# Patient Record
Sex: FEMALE | Race: BLACK OR AFRICAN AMERICAN | NOT HISPANIC OR LATINO | Employment: OTHER | URBAN - METROPOLITAN AREA
[De-identification: names, ages, dates, MRNs, and addresses within clinical notes are randomized per-mention and may not be internally consistent; named-entity substitution may affect disease eponyms.]

---

## 2017-01-25 DIAGNOSIS — M53.3 S I JOINT DYSFUNCTION: Primary | ICD-10-CM

## 2017-02-01 ENCOUNTER — CLINICAL SUPPORT (OUTPATIENT)
Dept: REHABILITATION | Facility: HOSPITAL | Age: 62
End: 2017-02-01
Attending: INTERNAL MEDICINE
Payer: MEDICARE

## 2017-02-01 DIAGNOSIS — M53.3 S I JOINT DYSFUNCTION: Primary | ICD-10-CM

## 2017-02-01 DIAGNOSIS — G89.29 CHRONIC LOW BACK PAIN, UNSPECIFIED BACK PAIN LATERALITY, WITH SCIATICA PRESENCE UNSPECIFIED: ICD-10-CM

## 2017-02-01 DIAGNOSIS — M54.5 CHRONIC LOW BACK PAIN, UNSPECIFIED BACK PAIN LATERALITY, WITH SCIATICA PRESENCE UNSPECIFIED: ICD-10-CM

## 2017-02-01 PROCEDURE — 97535 SELF CARE MNGMENT TRAINING: CPT | Performed by: PHYSICAL THERAPIST

## 2017-02-01 PROCEDURE — G8978 MOBILITY CURRENT STATUS: HCPCS | Mod: CJ | Performed by: PHYSICAL THERAPIST

## 2017-02-01 PROCEDURE — 97162 PT EVAL MOD COMPLEX 30 MIN: CPT | Performed by: PHYSICAL THERAPIST

## 2017-02-01 PROCEDURE — 97140 MANUAL THERAPY 1/> REGIONS: CPT | Performed by: PHYSICAL THERAPIST

## 2017-02-01 PROCEDURE — G8979 MOBILITY GOAL STATUS: HCPCS | Mod: CJ | Performed by: PHYSICAL THERAPIST
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
## 2017-02-07 ENCOUNTER — CLINICAL SUPPORT (OUTPATIENT)
Dept: REHABILITATION | Facility: HOSPITAL | Age: 62
End: 2017-02-07
Attending: INTERNAL MEDICINE
Payer: COMMERCIAL

## 2017-02-07 DIAGNOSIS — M54.5 CHRONIC LOW BACK PAIN, UNSPECIFIED BACK PAIN LATERALITY, WITH SCIATICA PRESENCE UNSPECIFIED: ICD-10-CM

## 2017-02-07 DIAGNOSIS — M53.3 S I JOINT DYSFUNCTION: Primary | ICD-10-CM

## 2017-02-07 DIAGNOSIS — G89.29 CHRONIC LOW BACK PAIN, UNSPECIFIED BACK PAIN LATERALITY, WITH SCIATICA PRESENCE UNSPECIFIED: ICD-10-CM

## 2017-02-07 PROCEDURE — 97110 THERAPEUTIC EXERCISES: CPT | Performed by: PHYSICAL THERAPIST

## 2017-02-07 PROCEDURE — 97140 MANUAL THERAPY 1/> REGIONS: CPT | Performed by: PHYSICAL THERAPIST
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
## 2017-02-09 ENCOUNTER — CLINICAL SUPPORT (OUTPATIENT)
Dept: REHABILITATION | Facility: HOSPITAL | Age: 62
End: 2017-02-09
Attending: INTERNAL MEDICINE
Payer: MEDICARE

## 2017-02-09 DIAGNOSIS — M54.5 CHRONIC LOW BACK PAIN, UNSPECIFIED BACK PAIN LATERALITY, WITH SCIATICA PRESENCE UNSPECIFIED: ICD-10-CM

## 2017-02-09 DIAGNOSIS — M53.3 S I JOINT DYSFUNCTION: Primary | ICD-10-CM

## 2017-02-09 DIAGNOSIS — G89.29 CHRONIC LOW BACK PAIN, UNSPECIFIED BACK PAIN LATERALITY, WITH SCIATICA PRESENCE UNSPECIFIED: ICD-10-CM

## 2017-02-09 PROCEDURE — 97110 THERAPEUTIC EXERCISES: CPT | Performed by: PHYSICAL THERAPIST

## 2017-02-09 PROCEDURE — 97140 MANUAL THERAPY 1/> REGIONS: CPT | Performed by: PHYSICAL THERAPIST
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
## 2017-02-14 ENCOUNTER — CLINICAL SUPPORT (OUTPATIENT)
Dept: REHABILITATION | Facility: HOSPITAL | Age: 62
End: 2017-02-14
Attending: INTERNAL MEDICINE
Payer: COMMERCIAL

## 2017-02-14 DIAGNOSIS — G89.29 CHRONIC LOW BACK PAIN, UNSPECIFIED BACK PAIN LATERALITY, WITH SCIATICA PRESENCE UNSPECIFIED: ICD-10-CM

## 2017-02-14 DIAGNOSIS — M54.5 CHRONIC LOW BACK PAIN, UNSPECIFIED BACK PAIN LATERALITY, WITH SCIATICA PRESENCE UNSPECIFIED: ICD-10-CM

## 2017-02-14 DIAGNOSIS — M53.3 S I JOINT DYSFUNCTION: Primary | ICD-10-CM

## 2017-02-14 PROCEDURE — 97110 THERAPEUTIC EXERCISES: CPT | Performed by: PHYSICAL THERAPIST
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
## 2017-02-21 ENCOUNTER — OFFICE VISIT (OUTPATIENT)
Dept: OBSTETRICS AND GYNECOLOGY | Facility: CLINIC | Age: 62
End: 2017-02-21
Payer: MEDICARE

## 2017-02-21 VITALS — HEIGHT: 66 IN | BODY MASS INDEX: 33.2 KG/M2 | WEIGHT: 206.56 LBS

## 2017-02-21 DIAGNOSIS — Z12.31 ENCOUNTER FOR SCREENING MAMMOGRAM FOR BREAST CANCER: Primary | ICD-10-CM

## 2017-02-21 DIAGNOSIS — N64.4 PAIN OF RIGHT BREAST: ICD-10-CM

## 2017-02-21 PROCEDURE — 99999 PR PBB SHADOW E&M-EST. PATIENT-LVL III: CPT | Mod: PBBFAC,,, | Performed by: OBSTETRICS & GYNECOLOGY

## 2017-02-21 PROCEDURE — 99213 OFFICE O/P EST LOW 20 MIN: CPT | Mod: S$GLB,,, | Performed by: OBSTETRICS & GYNECOLOGY

## 2017-02-21 RX ORDER — MAGNESIUM 200 MG
1 TABLET ORAL
COMMUNITY
Start: 2017-02-15

## 2017-02-21 RX ORDER — ATORVASTATIN CALCIUM 20 MG/1
10 TABLET, FILM COATED ORAL NIGHTLY
Refills: 0 | COMMUNITY
Start: 2016-12-21

## 2017-02-21 RX ORDER — MONTELUKAST SODIUM 10 MG/1
10 TABLET ORAL NIGHTLY
Refills: 1 | COMMUNITY
Start: 2017-02-15 | End: 2018-05-08

## 2017-02-21 RX ORDER — PROMETHAZINE HYDROCHLORIDE AND DEXTROMETHORPHAN HYDROBROMIDE 6.25; 15 MG/5ML; MG/5ML
SYRUP ORAL
Refills: 0 | COMMUNITY
Start: 2017-02-13 | End: 2017-05-02 | Stop reason: ALTCHOICE

## 2017-02-21 RX ORDER — AZITHROMYCIN 250 MG/1
TABLET, FILM COATED ORAL
COMMUNITY
Start: 2017-02-20 | End: 2017-02-25

## 2017-02-21 RX ORDER — VALSARTAN AND HYDROCHLOROTHIAZIDE 320; 12.5 MG/1; MG/1
1 TABLET, FILM COATED ORAL DAILY
Refills: 0 | COMMUNITY
Start: 2017-02-05

## 2017-02-21 NOTE — PROGRESS NOTES
"  Subjective:       Patient ID: Alisa Millan is a 62 y.o. female.    Chief Complaint:  Breast Pain (Right breast)    History of Present Illness  HPI  presents c/o "nagging pain" in right breast. has been present since last visit, no worsening. pt did fall 1 month ago & hit breast. normal up to date mmg, due for repeat in 3/2017. wears sports bras    GYN & OB History  No LMP recorded. Patient is postmenopausal.   Date of Last Pap: 3/23/2016    OB History    Para Term  AB SAB TAB Ectopic Multiple Living   2 2              # Outcome Date GA Lbr [place]/2nd Weight Sex Delivery Anes PTL Lv   2 Para            1 Para                   Review of Systems  Review of Systems   All other systems reviewed and are negative.    Objective:    Physical Exam:   Constitutional: She is oriented to person, place, and time. She appears well-developed and well-nourished.        Pulmonary/Chest: Effort normal.                      Neurological: She is alert and oriented to person, place, and time.    Skin: Skin is dry.    Psychiatric: She has a normal mood and affect. Her behavior is normal.        BREASTS: symmetrical, normal skin/nipple, no abnormal masses. Mild tenderness bilateral medial sides of breasts    Assessment:        1. Encounter for screening mammogram for breast cancer    2. Pain of right breast      Plan:      1. Nsaids/compresses-benign breast pain  2. mmg for 2017 scheduled      "

## 2017-03-01 ENCOUNTER — CLINICAL SUPPORT (OUTPATIENT)
Dept: REHABILITATION | Facility: HOSPITAL | Age: 62
End: 2017-03-01
Attending: INTERNAL MEDICINE
Payer: MEDICARE

## 2017-03-01 DIAGNOSIS — M54.5 CHRONIC LOW BACK PAIN, UNSPECIFIED BACK PAIN LATERALITY, WITH SCIATICA PRESENCE UNSPECIFIED: ICD-10-CM

## 2017-03-01 DIAGNOSIS — M53.3 S I JOINT DYSFUNCTION: Primary | ICD-10-CM

## 2017-03-01 DIAGNOSIS — G89.29 CHRONIC LOW BACK PAIN, UNSPECIFIED BACK PAIN LATERALITY, WITH SCIATICA PRESENCE UNSPECIFIED: ICD-10-CM

## 2017-03-01 PROCEDURE — 97110 THERAPEUTIC EXERCISES: CPT | Performed by: PHYSICAL THERAPIST

## 2017-03-01 PROCEDURE — 97140 MANUAL THERAPY 1/> REGIONS: CPT | Performed by: PHYSICAL THERAPIST
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
## 2017-03-08 ENCOUNTER — CLINICAL SUPPORT (OUTPATIENT)
Dept: REHABILITATION | Facility: HOSPITAL | Age: 62
End: 2017-03-08
Attending: INTERNAL MEDICINE
Payer: MEDICARE

## 2017-03-08 DIAGNOSIS — G89.29 CHRONIC LOW BACK PAIN, UNSPECIFIED BACK PAIN LATERALITY, WITH SCIATICA PRESENCE UNSPECIFIED: ICD-10-CM

## 2017-03-08 DIAGNOSIS — M53.3 S I JOINT DYSFUNCTION: Primary | ICD-10-CM

## 2017-03-08 DIAGNOSIS — M54.5 CHRONIC LOW BACK PAIN, UNSPECIFIED BACK PAIN LATERALITY, WITH SCIATICA PRESENCE UNSPECIFIED: ICD-10-CM

## 2017-03-08 PROCEDURE — 97140 MANUAL THERAPY 1/> REGIONS: CPT | Performed by: PHYSICAL THERAPIST

## 2017-03-08 PROCEDURE — 97110 THERAPEUTIC EXERCISES: CPT | Performed by: PHYSICAL THERAPIST
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
## 2017-03-15 ENCOUNTER — CLINICAL SUPPORT (OUTPATIENT)
Dept: REHABILITATION | Facility: HOSPITAL | Age: 62
End: 2017-03-15
Attending: INTERNAL MEDICINE
Payer: MEDICARE

## 2017-03-15 DIAGNOSIS — G89.29 CHRONIC LOW BACK PAIN, UNSPECIFIED BACK PAIN LATERALITY, WITH SCIATICA PRESENCE UNSPECIFIED: ICD-10-CM

## 2017-03-15 DIAGNOSIS — M53.3 S I JOINT DYSFUNCTION: Primary | ICD-10-CM

## 2017-03-15 DIAGNOSIS — M54.5 CHRONIC LOW BACK PAIN, UNSPECIFIED BACK PAIN LATERALITY, WITH SCIATICA PRESENCE UNSPECIFIED: ICD-10-CM

## 2017-03-15 PROCEDURE — 97110 THERAPEUTIC EXERCISES: CPT | Performed by: PHYSICAL THERAPIST

## 2017-03-15 PROCEDURE — 97140 MANUAL THERAPY 1/> REGIONS: CPT | Performed by: PHYSICAL THERAPIST

## 2017-04-05 ENCOUNTER — CLINICAL SUPPORT (OUTPATIENT)
Dept: REHABILITATION | Facility: HOSPITAL | Age: 62
End: 2017-04-05
Attending: INTERNAL MEDICINE
Payer: COMMERCIAL

## 2017-04-05 DIAGNOSIS — M53.3 S I JOINT DYSFUNCTION: Primary | ICD-10-CM

## 2017-04-05 DIAGNOSIS — M54.5 CHRONIC LOW BACK PAIN, UNSPECIFIED BACK PAIN LATERALITY, WITH SCIATICA PRESENCE UNSPECIFIED: ICD-10-CM

## 2017-04-05 DIAGNOSIS — G89.29 CHRONIC LOW BACK PAIN, UNSPECIFIED BACK PAIN LATERALITY, WITH SCIATICA PRESENCE UNSPECIFIED: ICD-10-CM

## 2017-04-05 PROCEDURE — 97140 MANUAL THERAPY 1/> REGIONS: CPT | Performed by: PHYSICAL THERAPIST

## 2017-04-05 PROCEDURE — 97110 THERAPEUTIC EXERCISES: CPT | Performed by: PHYSICAL THERAPIST
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
## 2017-04-07 ENCOUNTER — HOSPITAL ENCOUNTER (OUTPATIENT)
Dept: RADIOLOGY | Facility: HOSPITAL | Age: 62
Discharge: HOME | End: 2017-04-07
Attending: OBSTETRICS & GYNECOLOGY
Payer: MEDICARE

## 2017-04-07 DIAGNOSIS — Z12.31 ENCOUNTER FOR SCREENING MAMMOGRAM FOR BREAST CANCER: ICD-10-CM

## 2017-04-07 PROCEDURE — 77067 SCR MAMMO BI INCL CAD: CPT | Mod: TC

## 2017-04-07 PROCEDURE — 77063 BREAST TOMOSYNTHESIS BI: CPT | Mod: 26,,, | Performed by: RADIOLOGY

## 2017-04-07 PROCEDURE — 77067 SCR MAMMO BI INCL CAD: CPT | Mod: 26,,, | Performed by: RADIOLOGY

## 2017-04-12 ENCOUNTER — CLINICAL SUPPORT (OUTPATIENT)
Dept: REHABILITATION | Facility: HOSPITAL | Age: 62
End: 2017-04-12
Attending: INTERNAL MEDICINE
Payer: COMMERCIAL

## 2017-04-12 DIAGNOSIS — M54.5 CHRONIC LOW BACK PAIN, UNSPECIFIED BACK PAIN LATERALITY, WITH SCIATICA PRESENCE UNSPECIFIED: ICD-10-CM

## 2017-04-12 DIAGNOSIS — G89.29 CHRONIC LOW BACK PAIN, UNSPECIFIED BACK PAIN LATERALITY, WITH SCIATICA PRESENCE UNSPECIFIED: ICD-10-CM

## 2017-04-12 DIAGNOSIS — M53.3 S I JOINT DYSFUNCTION: Primary | ICD-10-CM

## 2017-04-12 PROCEDURE — 97140 MANUAL THERAPY 1/> REGIONS: CPT | Performed by: PHYSICAL THERAPIST

## 2017-04-12 PROCEDURE — 97110 THERAPEUTIC EXERCISES: CPT | Performed by: PHYSICAL THERAPIST
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
## 2017-04-19 ENCOUNTER — CLINICAL SUPPORT (OUTPATIENT)
Dept: REHABILITATION | Facility: HOSPITAL | Age: 62
End: 2017-04-19
Attending: INTERNAL MEDICINE
Payer: COMMERCIAL

## 2017-04-19 DIAGNOSIS — G89.29 CHRONIC LOW BACK PAIN, UNSPECIFIED BACK PAIN LATERALITY, WITH SCIATICA PRESENCE UNSPECIFIED: ICD-10-CM

## 2017-04-19 DIAGNOSIS — M53.3 S I JOINT DYSFUNCTION: Primary | ICD-10-CM

## 2017-04-19 DIAGNOSIS — M54.5 CHRONIC LOW BACK PAIN, UNSPECIFIED BACK PAIN LATERALITY, WITH SCIATICA PRESENCE UNSPECIFIED: ICD-10-CM

## 2017-04-19 PROCEDURE — G8978 MOBILITY CURRENT STATUS: HCPCS | Mod: CI | Performed by: PHYSICAL THERAPIST

## 2017-04-19 PROCEDURE — G8979 MOBILITY GOAL STATUS: HCPCS | Mod: CJ | Performed by: PHYSICAL THERAPIST

## 2017-04-19 PROCEDURE — 97140 MANUAL THERAPY 1/> REGIONS: CPT | Performed by: PHYSICAL THERAPIST

## 2017-04-19 PROCEDURE — 97110 THERAPEUTIC EXERCISES: CPT | Performed by: PHYSICAL THERAPIST
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
## 2017-04-28 ENCOUNTER — CLINICAL SUPPORT (OUTPATIENT)
Dept: REHABILITATION | Facility: HOSPITAL | Age: 62
End: 2017-04-28
Attending: INTERNAL MEDICINE
Payer: COMMERCIAL

## 2017-04-28 DIAGNOSIS — M53.3 S I JOINT DYSFUNCTION: Primary | ICD-10-CM

## 2017-04-28 DIAGNOSIS — G89.29 CHRONIC LOW BACK PAIN, UNSPECIFIED BACK PAIN LATERALITY, WITH SCIATICA PRESENCE UNSPECIFIED: ICD-10-CM

## 2017-04-28 DIAGNOSIS — M54.5 CHRONIC LOW BACK PAIN, UNSPECIFIED BACK PAIN LATERALITY, WITH SCIATICA PRESENCE UNSPECIFIED: ICD-10-CM

## 2017-04-28 PROCEDURE — 97110 THERAPEUTIC EXERCISES: CPT | Performed by: PHYSICAL THERAPIST

## 2017-04-28 PROCEDURE — 97140 MANUAL THERAPY 1/> REGIONS: CPT | Performed by: PHYSICAL THERAPIST
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
## 2017-05-02 ENCOUNTER — CLINICAL SUPPORT (OUTPATIENT)
Dept: REHABILITATION | Facility: HOSPITAL | Age: 62
End: 2017-05-02
Attending: INTERNAL MEDICINE
Payer: COMMERCIAL

## 2017-05-02 ENCOUNTER — OFFICE VISIT (OUTPATIENT)
Dept: OPHTHALMOLOGY | Facility: CLINIC | Age: 62
End: 2017-05-02
Payer: COMMERCIAL

## 2017-05-02 DIAGNOSIS — G89.29 CHRONIC LOW BACK PAIN, UNSPECIFIED BACK PAIN LATERALITY, WITH SCIATICA PRESENCE UNSPECIFIED: ICD-10-CM

## 2017-05-02 DIAGNOSIS — H52.4 MYOPIA WITH PRESBYOPIA, BILATERAL: ICD-10-CM

## 2017-05-02 DIAGNOSIS — M53.3 S I JOINT DYSFUNCTION: Primary | ICD-10-CM

## 2017-05-02 DIAGNOSIS — M54.5 CHRONIC LOW BACK PAIN, UNSPECIFIED BACK PAIN LATERALITY, WITH SCIATICA PRESENCE UNSPECIFIED: ICD-10-CM

## 2017-05-02 DIAGNOSIS — E11.9 TYPE 2 DIABETES MELLITUS WITHOUT COMPLICATION, UNSPECIFIED LONG TERM INSULIN USE STATUS: Primary | ICD-10-CM

## 2017-05-02 DIAGNOSIS — H52.13 MYOPIA WITH PRESBYOPIA, BILATERAL: ICD-10-CM

## 2017-05-02 DIAGNOSIS — Z13.5 GLAUCOMA SCREENING: ICD-10-CM

## 2017-05-02 PROCEDURE — 92015 DETERMINE REFRACTIVE STATE: CPT | Mod: S$GLB,,, | Performed by: OPTOMETRIST

## 2017-05-02 PROCEDURE — 99999 PR PBB SHADOW E&M-EST. PATIENT-LVL I: CPT | Mod: PBBFAC,,, | Performed by: OPTOMETRIST

## 2017-05-02 PROCEDURE — 97110 THERAPEUTIC EXERCISES: CPT | Performed by: PHYSICAL THERAPIST

## 2017-05-02 PROCEDURE — 92014 COMPRE OPH EXAM EST PT 1/>: CPT | Mod: S$GLB,,, | Performed by: OPTOMETRIST

## 2017-05-02 NOTE — PROGRESS NOTES
HPI     Diabetic Eye Exam    Additional comments: insulin resistance           Blurred Vision    Additional comments: near           Comments   Pt's last eye exam was 3/8/16 with slc. Wears contacts, both dailies and   monthly lenses. Has SV glasses, last updated a few years ago. States   possible change in va, slight change in near. No other complaints. Uses   visine prn for redness.        Last edited by Anyi Mackenzie on 5/2/2017  3:25 PM. (History)            Assessment /Plan     For exam results, see Encounter Report.    Type 2 diabetes mellitus without complication, unspecified long term insulin use status    Glaucoma screening    Myopia with presbyopia, bilateral      No diabetic retinopathy both eyes.  Glaucoma screening negative both eyes.  Updated glasses prescription.  Return to clinic 1 yr.
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
## 2017-05-16 ENCOUNTER — CLINICAL SUPPORT (OUTPATIENT)
Dept: REHABILITATION | Facility: HOSPITAL | Age: 62
End: 2017-05-16
Attending: INTERNAL MEDICINE
Payer: MEDICARE

## 2017-05-16 DIAGNOSIS — M53.3 S I JOINT DYSFUNCTION: Primary | ICD-10-CM

## 2017-05-16 DIAGNOSIS — M54.5 CHRONIC LOW BACK PAIN, UNSPECIFIED BACK PAIN LATERALITY, WITH SCIATICA PRESENCE UNSPECIFIED: ICD-10-CM

## 2017-05-16 DIAGNOSIS — G89.29 CHRONIC LOW BACK PAIN, UNSPECIFIED BACK PAIN LATERALITY, WITH SCIATICA PRESENCE UNSPECIFIED: ICD-10-CM

## 2017-05-16 PROCEDURE — 97110 THERAPEUTIC EXERCISES: CPT | Mod: PO | Performed by: PHYSICAL THERAPIST

## 2017-05-16 PROCEDURE — 97140 MANUAL THERAPY 1/> REGIONS: CPT | Performed by: PHYSICAL THERAPIST
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
## 2017-05-31 ENCOUNTER — CLINICAL SUPPORT (OUTPATIENT)
Dept: REHABILITATION | Facility: HOSPITAL | Age: 62
End: 2017-05-31
Attending: INTERNAL MEDICINE
Payer: MEDICARE

## 2017-05-31 DIAGNOSIS — M54.5 CHRONIC LOW BACK PAIN, UNSPECIFIED BACK PAIN LATERALITY, WITH SCIATICA PRESENCE UNSPECIFIED: ICD-10-CM

## 2017-05-31 DIAGNOSIS — M53.3 S I JOINT DYSFUNCTION: Primary | ICD-10-CM

## 2017-05-31 DIAGNOSIS — G89.29 CHRONIC LOW BACK PAIN, UNSPECIFIED BACK PAIN LATERALITY, WITH SCIATICA PRESENCE UNSPECIFIED: ICD-10-CM

## 2017-05-31 PROCEDURE — 97140 MANUAL THERAPY 1/> REGIONS: CPT | Performed by: PHYSICAL THERAPIST

## 2017-05-31 PROCEDURE — 97530 THERAPEUTIC ACTIVITIES: CPT | Performed by: PHYSICAL THERAPIST
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
## 2017-06-28 ENCOUNTER — CLINICAL SUPPORT (OUTPATIENT)
Dept: REHABILITATION | Facility: HOSPITAL | Age: 62
End: 2017-06-28
Attending: INTERNAL MEDICINE
Payer: MEDICARE

## 2017-06-28 DIAGNOSIS — M53.3 S I JOINT DYSFUNCTION: Primary | ICD-10-CM

## 2017-06-28 DIAGNOSIS — G89.29 CHRONIC LOW BACK PAIN, UNSPECIFIED BACK PAIN LATERALITY, WITH SCIATICA PRESENCE UNSPECIFIED: ICD-10-CM

## 2017-06-28 DIAGNOSIS — M54.5 CHRONIC LOW BACK PAIN, UNSPECIFIED BACK PAIN LATERALITY, WITH SCIATICA PRESENCE UNSPECIFIED: ICD-10-CM

## 2017-06-28 PROCEDURE — 97110 THERAPEUTIC EXERCISES: CPT | Performed by: PHYSICAL THERAPIST

## 2017-06-28 PROCEDURE — 97140 MANUAL THERAPY 1/> REGIONS: CPT | Performed by: PHYSICAL THERAPIST
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
## 2017-07-12 ENCOUNTER — CLINICAL SUPPORT (OUTPATIENT)
Dept: REHABILITATION | Facility: HOSPITAL | Age: 62
End: 2017-07-12
Attending: INTERNAL MEDICINE
Payer: MEDICARE

## 2017-07-12 DIAGNOSIS — M54.5 CHRONIC LOW BACK PAIN, UNSPECIFIED BACK PAIN LATERALITY, WITH SCIATICA PRESENCE UNSPECIFIED: ICD-10-CM

## 2017-07-12 DIAGNOSIS — G89.29 CHRONIC LOW BACK PAIN, UNSPECIFIED BACK PAIN LATERALITY, WITH SCIATICA PRESENCE UNSPECIFIED: ICD-10-CM

## 2017-07-12 DIAGNOSIS — M53.3 S I JOINT DYSFUNCTION: Primary | ICD-10-CM

## 2017-07-12 PROCEDURE — 97110 THERAPEUTIC EXERCISES: CPT | Performed by: PHYSICAL THERAPIST

## 2017-07-12 PROCEDURE — 97140 MANUAL THERAPY 1/> REGIONS: CPT | Performed by: PHYSICAL THERAPIST
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
## 2018-04-06 ENCOUNTER — TELEPHONE (OUTPATIENT)
Dept: OBSTETRICS AND GYNECOLOGY | Facility: CLINIC | Age: 63
End: 2018-04-06

## 2018-04-06 DIAGNOSIS — Z12.39 BREAST CANCER SCREENING: Primary | ICD-10-CM

## 2018-04-06 NOTE — TELEPHONE ENCOUNTER
----- Message from Cassidy Lo sent at 4/6/2018  2:55 PM CDT -----  Contact: Pt   Pt request orders for a Mammogram. Request call back to schedule..[phone] (home)

## 2018-04-12 ENCOUNTER — HOSPITAL ENCOUNTER (OUTPATIENT)
Dept: RADIOLOGY | Facility: HOSPITAL | Age: 63
Discharge: HOME | End: 2018-04-12
Attending: OBSTETRICS & GYNECOLOGY
Payer: MEDICARE

## 2018-04-12 DIAGNOSIS — Z12.39 BREAST CANCER SCREENING: ICD-10-CM

## 2018-04-12 PROCEDURE — 77063 BREAST TOMOSYNTHESIS BI: CPT | Mod: 26,,, | Performed by: RADIOLOGY

## 2018-04-12 PROCEDURE — 77067 SCR MAMMO BI INCL CAD: CPT | Mod: TC

## 2018-04-12 PROCEDURE — 77067 SCR MAMMO BI INCL CAD: CPT | Mod: 26,,, | Performed by: RADIOLOGY

## 2018-05-08 ENCOUNTER — OFFICE VISIT (OUTPATIENT)
Dept: OPHTHALMOLOGY | Facility: CLINIC | Age: 63
End: 2018-05-08
Payer: MEDICARE

## 2018-05-08 DIAGNOSIS — H52.13 MYOPIA WITH PRESBYOPIA OF BOTH EYES: ICD-10-CM

## 2018-05-08 DIAGNOSIS — Z79.899 LONG-TERM USE OF PLAQUENIL: ICD-10-CM

## 2018-05-08 DIAGNOSIS — H52.4 MYOPIA WITH PRESBYOPIA OF BOTH EYES: ICD-10-CM

## 2018-05-08 DIAGNOSIS — M15.4 EROSIVE OSTEOARTHRITIS OF HANDS, BILATERAL: Primary | ICD-10-CM

## 2018-05-08 PROCEDURE — 99999 PR PBB SHADOW E&M-EST. PATIENT-LVL I: CPT | Mod: PBBFAC,,, | Performed by: OPTOMETRIST

## 2018-05-08 PROCEDURE — 92015 DETERMINE REFRACTIVE STATE: CPT | Mod: S$GLB,,, | Performed by: OPTOMETRIST

## 2018-05-08 PROCEDURE — 92014 COMPRE OPH EXAM EST PT 1/>: CPT | Mod: S$GLB,,, | Performed by: OPTOMETRIST

## 2018-05-08 RX ORDER — HYDROXYCHLOROQUINE SULFATE 200 MG/1
200 TABLET, FILM COATED ORAL
COMMUNITY
Start: 2018-04-26 | End: 2019-04-26

## 2018-05-08 NOTE — PROGRESS NOTES
HPI     Diabetic Eye Exam    Additional comments: Yearly           Comments   Last seen by [facility] on 5/2/17 for yearly DM exam. Patient is prescribed   Plaquenil for RA has not started taking it yet.   No changes in vision   Pt wears CTL and SVL Glasses (not worn today)  No other complaints  No Drops  Deferred dilation           Last edited by Dulce Moss, PCT on 5/8/2018 10:26 AM. (History)            Assessment /Plan     For exam results, see Encounter Report.    Erosive osteoarthritis of hands, bilateral    Long-term use of Plaquenil    Myopia with presbyopia of both eyes      Patient is here for examination before beginning plaquenil, but cannot be dilated today.  She will return (n/c visit) for HVF-10, MOCT and dilated fundus exam.  Glasses and contact lens prescriptions updated today

## 2018-05-14 ENCOUNTER — OFFICE VISIT (OUTPATIENT)
Dept: OPHTHALMOLOGY | Facility: CLINIC | Age: 63
End: 2018-05-14
Payer: MEDICARE

## 2018-05-14 DIAGNOSIS — Z79.899 LONG-TERM USE OF PLAQUENIL: Primary | ICD-10-CM

## 2018-05-14 PROCEDURE — 92014 COMPRE OPH EXAM EST PT 1/>: CPT | Mod: S$GLB,,, | Performed by: OPTOMETRIST

## 2018-05-14 PROCEDURE — 92134 CPTRZ OPH DX IMG PST SGM RTA: CPT | Mod: S$GLB,,, | Performed by: OPTOMETRIST

## 2018-05-14 PROCEDURE — 99999 PR PBB SHADOW E&M-EST. PATIENT-LVL II: CPT | Mod: PBBFAC,,, | Performed by: OPTOMETRIST

## 2018-05-14 PROCEDURE — 92083 EXTENDED VISUAL FIELD XM: CPT | Mod: S$GLB,,, | Performed by: OPTOMETRIST

## 2018-05-14 RX ORDER — ALPRAZOLAM 0.5 MG/1
TABLET ORAL
Refills: 0 | COMMUNITY
Start: 2018-05-09

## 2018-06-09 ENCOUNTER — OFFICE VISIT (OUTPATIENT)
Dept: URGENT CARE | Facility: CLINIC | Age: 63
End: 2018-06-09
Payer: MEDICARE

## 2018-06-09 VITALS
HEIGHT: 66 IN | HEART RATE: 75 BPM | WEIGHT: 211.63 LBS | SYSTOLIC BLOOD PRESSURE: 110 MMHG | DIASTOLIC BLOOD PRESSURE: 90 MMHG | TEMPERATURE: 99 F | OXYGEN SATURATION: 99 % | BODY MASS INDEX: 34.01 KG/M2

## 2018-06-09 DIAGNOSIS — I82.622: Primary | ICD-10-CM

## 2018-06-09 PROCEDURE — 99999 PR PBB SHADOW E&M-EST. PATIENT-LVL III: CPT | Mod: PBBFAC,,, | Performed by: PHYSICIAN ASSISTANT

## 2018-06-09 PROCEDURE — 99212 OFFICE O/P EST SF 10 MIN: CPT | Mod: S$GLB,,, | Performed by: PHYSICIAN ASSISTANT

## 2018-06-09 RX ORDER — FUROSEMIDE 20 MG/1
20 TABLET ORAL
COMMUNITY
Start: 2018-05-18 | End: 2019-05-18

## 2018-06-10 NOTE — PROGRESS NOTES
Ms Millan is a pleasant 64 y/o female presenting to have a 3-4 cm lesion on the left arm evaluate and r/o DVT. She was recently diagnosed with DVT in the lower extremity and was placed on Xarelto. This lesion appeared shortly after starting. She also has a PMH of PE as well.     The lesion is a 3-4 cm mass on the medial aspect of the left UE forearm. It is mildly tender to touch and is freely mobile. The distal neuro-vascular status is intact and she mainltains full active ROM.    Advised patient that she would nee U/S to further evaluate this lesion and she was referred to the ED.     Evelyn Goodman PA-C
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
## 2018-11-07 ENCOUNTER — OFFICE VISIT (OUTPATIENT)
Dept: OBSTETRICS AND GYNECOLOGY | Facility: CLINIC | Age: 63
End: 2018-11-07
Payer: MEDICARE

## 2018-11-07 VITALS
WEIGHT: 212 LBS | SYSTOLIC BLOOD PRESSURE: 126 MMHG | DIASTOLIC BLOOD PRESSURE: 88 MMHG | BODY MASS INDEX: 34.07 KG/M2 | HEIGHT: 66 IN

## 2018-11-07 DIAGNOSIS — Z01.419 ENCOUNTER FOR GYNECOLOGICAL EXAMINATION WITHOUT ABNORMAL FINDING: Primary | ICD-10-CM

## 2018-11-07 PROCEDURE — G0101 CA SCREEN;PELVIC/BREAST EXAM: HCPCS | Mod: S$GLB,,, | Performed by: OBSTETRICS & GYNECOLOGY

## 2018-11-07 PROCEDURE — 99999 PR PBB SHADOW E&M-EST. PATIENT-LVL III: CPT | Mod: PBBFAC,,, | Performed by: OBSTETRICS & GYNECOLOGY

## 2018-11-07 RX ORDER — RIVAROXABAN 20 MG/1
1 TABLET, FILM COATED ORAL DAILY
Refills: 2 | COMMUNITY
Start: 2018-08-20

## 2018-11-07 RX ORDER — CIPROFLOXACIN 500 MG/1
TABLET ORAL
COMMUNITY
Start: 2018-11-06

## 2018-11-07 RX ORDER — MECLIZINE HYDROCHLORIDE CHEWABLE TABLETS 25 MG/1
25 TABLET, CHEWABLE ORAL 3 TIMES DAILY PRN
COMMUNITY

## 2018-11-07 RX ORDER — CALCIUM CARBONATE/VITAMIN D3 600MG-5MCG
TABLET ORAL
COMMUNITY
End: 2018-11-07

## 2018-11-07 RX ORDER — ONDANSETRON 4 MG/1
4 TABLET, FILM COATED ORAL EVERY 8 HOURS PRN
COMMUNITY

## 2018-11-11 NOTE — PROGRESS NOTES
CC: Well woman exam    Alisa Millan is a 63 y.o. female  presents for a well woman exam.  LMP: No LMP recorded. Patient is postmenopausal..  Right breast pain still present (few years now; all normal mmg)    Past Medical History:   Diagnosis Date    Allergy     Anxiety     Clotting disorder 2006    pulmonary embolus status post knee surgery positive antiphospholipid and    Depression     GERD (gastroesophageal reflux disease)     History of uterine fibroid     Hypertension     Insulin resistance      Past Surgical History:   Procedure Laterality Date     SECTION, CLASSIC      KNEE SURGERY      bilateral knee replacement     Social History     Socioeconomic History    Marital status:      Spouse name: None    Number of children: None    Years of education: None    Highest education level: None   Social Needs    Financial resource strain: None    Food insecurity - worry: None    Food insecurity - inability: None    Transportation needs - medical: None    Transportation needs - non-medical: None   Occupational History    None   Tobacco Use    Smoking status: Never Smoker    Smokeless tobacco: Never Used   Substance and Sexual Activity    Alcohol use: Yes     Comment: rarely    Drug use: No    Sexual activity: No     Birth control/protection: Post-menopausal   Other Topics Concern    None   Social History Narrative    None     Family History   Problem Relation Age of Onset    Breast cancer Sister     Diabetes Sister     Hypertension Sister     Clotting disorder Brother         pulmonary embolus    Diabetes Mother     Hypertension Mother     Diabetes Sister     Hypertension Sister     Colon cancer Neg Hx     Ovarian cancer Neg Hx     Uterine cancer Neg Hx      OB History      Para Term  AB Living    2 2 2     2    SAB TAB Ectopic Multiple Live Births            2          Current Outpatient Medications:     ALPRAZolam (XANAX) 0.5 MG tablet,  "take 1/2-1 tablet by mouth once daily if needed for anxiety, Disp: , Rfl: 0    aspirin (ECOTRIN) 81 MG EC tablet, Take 81 mg by mouth once daily., Disp: , Rfl:     atorvastatin (LIPITOR) 20 MG tablet, Take 10 mg by mouth nightly., Disp: , Rfl: 0    cholecalciferol, vitamin D3, 50,000 unit capsule, , Disp: , Rfl: 0    cyanocobalamin, vitamin B-12, 1,000 mcg Subl, Place 1 tablet under the tongue., Disp: , Rfl:     fexofenadine (ALLEGRA) 30 MG tablet, Take 30 mg by mouth once daily. , Disp: , Rfl:     FLUARIX QUAD 0841-5013, PF, 60 mcg (15 mcg x 4)/0.5 mL Syrg vaccine, ADM 0.5ML IM UTD, Disp: , Rfl: 0    fluoxetine (PROZAC) 20 MG capsule, Take by mouth Daily., Disp: , Rfl:     hydrocodone-acetaminophen 10-325mg (NORCO)  mg Tab, Take by mouth., Disp: , Rfl:     hydroxychloroquine (PLAQUENIL) 200 mg tablet, Take 200 mg by mouth., Disp: , Rfl:     sitagliptan (JANUVIA) 100 MG Tab, Take by mouth. 1 tablet Oral At bedtime, Disp: , Rfl:     valsartan-hydrochlorothiazide (DIOVAN-HCT) 320-12.5 mg per tablet, Take 1 tablet by mouth once daily., Disp: , Rfl: 0    XARELTO 20 mg Tab, Take 1 tablet by mouth once daily., Disp: , Rfl: 2    ciprofloxacin HCl (CIPRO) 500 MG tablet, , Disp: , Rfl:     furosemide (LASIX) 20 MG tablet, Take 20 mg by mouth., Disp: , Rfl:     meclizine (ANTIVERT) 32 MG tablet, Take 25 mg by mouth 3 (three) times daily as needed., Disp: , Rfl:     ondansetron (ZOFRAN) 4 MG tablet, Take 4 mg by mouth every 8 (eight) hours as needed., Disp: , Rfl:     GYNECOLOGY HISTORY:  No abnormal pap/sd    DATA REVIEWED:  Last pap: normal Date: 2016  Last mmg: normal Date: 2018  Last colonoscopy: normal Date: 2016    /88   Ht 5' 6" (1.676 m)   Wt 96.2 kg (212 lb)   BMI 34.22 kg/m²     ROS:  GENERAL: Denies weight gain or weight loss. Feeling well overall.   SKIN: Denies rash or lesions.   HEAD: Denies head injury or headache.NODES: Denies enlarged lymph nodes.   CHEST: Denies chest pain or " shortness of breath.   CARDIOVASCULAR: Denies palpitations or left sided chest pain.   ABDOMEN: No abdominal pain, constipation, diarrhea, nausea, vomiting or rectal bleeding.   URINARY: No frequency, dysuria, hematuria, or burning on urination.  REPRODUCTIVE: See HPI.   BREASTS: The patient denies pain, lumps, or nipple discharge.   HEMATOLOGIC: No easy bruisability or excessive bleeding.   MUSCULOSKELETAL: Denies joint pain or swelling.   NEUROLOGIC: Denies syncope or weakness.   PSYCHIATRIC: Denies depression, anxiety or mood swings.    PHYSICAL EXAM:    APPEARANCE: Well nourished, well developed, in no acute distress.  AFFECT: WNL, alert and oriented x 3  SKIN: No acne or hirsutism  NECK: Neck symmetric without masses or thyromegaly  NODES: No inguinal, cervical, axillary, or femoral lymph node enlargement  CHEST: Good respiratory effect  ABDOMEN: Soft.  No tenderness or masses.  No hepatosplenomegaly.  No hernias.  BREASTS: Symmetrical, no skin changes or visible lesions.  No palpable masses, nipple discharge bilaterally.  PELVIC: Normal external genitalia without lesions.  Normal hair distribution.  Adequate perineal body, normal urethral meatus.  Vagina atrophic without lesions or discharge.  Cervix pink, without lesions, discharge or tenderness.  No significant cystocele or rectocele.  Bimanual exam shows uterus to be normal size, regular, mobile and nontender.  Adnexa without masses or tenderness.   EXTREMITIES: No edema.    Encounter for gynecological examination without abnormal finding    Patient was counseled today on A.C.S. Pap guidelines (q3) and recommendations for pelvic exams q2y, yearly mammograms starting age 40, and clinical breast exams; to see her PCP for other health maintenance.

## 2019-03-20 ENCOUNTER — OFFICE VISIT (OUTPATIENT)
Dept: OBSTETRICS AND GYNECOLOGY | Facility: CLINIC | Age: 64
End: 2019-03-20
Payer: MEDICARE

## 2019-03-20 VITALS
BODY MASS INDEX: 39.39 KG/M2 | WEIGHT: 214.06 LBS | HEIGHT: 62 IN | SYSTOLIC BLOOD PRESSURE: 118 MMHG | DIASTOLIC BLOOD PRESSURE: 68 MMHG

## 2019-03-20 DIAGNOSIS — N64.4 BREAST PAIN, RIGHT: ICD-10-CM

## 2019-03-20 DIAGNOSIS — Z12.31 ENCOUNTER FOR SCREENING MAMMOGRAM FOR BREAST CANCER: Primary | ICD-10-CM

## 2019-03-20 PROCEDURE — 1101F PT FALLS ASSESS-DOCD LE1/YR: CPT | Mod: CPTII,S$GLB,, | Performed by: OBSTETRICS & GYNECOLOGY

## 2019-03-20 PROCEDURE — 3008F PR BODY MASS INDEX (BMI) DOCUMENTED: ICD-10-PCS | Mod: CPTII,S$GLB,, | Performed by: OBSTETRICS & GYNECOLOGY

## 2019-03-20 PROCEDURE — 1101F PR PT FALLS ASSESS DOC 0-1 FALLS W/OUT INJ PAST YR: ICD-10-PCS | Mod: CPTII,S$GLB,, | Performed by: OBSTETRICS & GYNECOLOGY

## 2019-03-20 PROCEDURE — 3008F BODY MASS INDEX DOCD: CPT | Mod: CPTII,S$GLB,, | Performed by: OBSTETRICS & GYNECOLOGY

## 2019-03-20 PROCEDURE — 99999 PR PBB SHADOW E&M-EST. PATIENT-LVL III: CPT | Mod: PBBFAC,,, | Performed by: OBSTETRICS & GYNECOLOGY

## 2019-03-20 PROCEDURE — 99213 OFFICE O/P EST LOW 20 MIN: CPT | Mod: S$GLB,,, | Performed by: OBSTETRICS & GYNECOLOGY

## 2019-03-20 PROCEDURE — 99213 PR OFFICE/OUTPT VISIT, EST, LEVL III, 20-29 MIN: ICD-10-PCS | Mod: S$GLB,,, | Performed by: OBSTETRICS & GYNECOLOGY

## 2019-03-20 PROCEDURE — 99999 PR PBB SHADOW E&M-EST. PATIENT-LVL III: ICD-10-PCS | Mod: PBBFAC,,, | Performed by: OBSTETRICS & GYNECOLOGY

## 2019-04-16 ENCOUNTER — HOSPITAL ENCOUNTER (OUTPATIENT)
Dept: RADIOLOGY | Facility: HOSPITAL | Age: 64
Discharge: HOME | End: 2019-04-16
Attending: OBSTETRICS & GYNECOLOGY
Payer: MEDICARE

## 2019-04-16 VITALS — WEIGHT: 214 LBS | BODY MASS INDEX: 39.38 KG/M2 | HEIGHT: 62 IN

## 2019-04-16 DIAGNOSIS — Z12.31 ENCOUNTER FOR SCREENING MAMMOGRAM FOR BREAST CANCER: ICD-10-CM

## 2019-04-16 PROCEDURE — 77067 SCR MAMMO BI INCL CAD: CPT | Mod: TC

## 2019-04-16 PROCEDURE — 77067 SCR MAMMO BI INCL CAD: CPT | Mod: 26,,, | Performed by: RADIOLOGY

## 2019-04-16 PROCEDURE — 77063 MAMMO DIGITAL SCREENING BILAT WITH TOMOSYNTHESIS_CAD: ICD-10-PCS | Mod: 26,,, | Performed by: RADIOLOGY

## 2019-04-16 PROCEDURE — 77063 BREAST TOMOSYNTHESIS BI: CPT | Mod: 26,,, | Performed by: RADIOLOGY

## 2019-04-16 PROCEDURE — 77067 MAMMO DIGITAL SCREENING BILAT WITH TOMOSYNTHESIS_CAD: ICD-10-PCS | Mod: 26,,, | Performed by: RADIOLOGY

## 2019-05-28 ENCOUNTER — OFFICE VISIT (OUTPATIENT)
Dept: OPHTHALMOLOGY | Facility: CLINIC | Age: 64
End: 2019-05-28
Payer: MEDICARE

## 2019-05-28 DIAGNOSIS — Z13.5 GLAUCOMA SCREENING: ICD-10-CM

## 2019-05-28 DIAGNOSIS — M15.4 EROSIVE OSTEOARTHRITIS OF HANDS, BILATERAL: ICD-10-CM

## 2019-05-28 DIAGNOSIS — H52.13 MYOPIA WITH PRESBYOPIA OF BOTH EYES: ICD-10-CM

## 2019-05-28 DIAGNOSIS — H52.4 MYOPIA WITH PRESBYOPIA OF BOTH EYES: ICD-10-CM

## 2019-05-28 DIAGNOSIS — Z79.899 LONG-TERM USE OF PLAQUENIL: Primary | ICD-10-CM

## 2019-05-28 PROCEDURE — 92014 COMPRE OPH EXAM EST PT 1/>: CPT | Mod: S$GLB,,, | Performed by: OPTOMETRIST

## 2019-05-28 PROCEDURE — 99999 PR PBB SHADOW E&M-EST. PATIENT-LVL I: ICD-10-PCS | Mod: PBBFAC,,, | Performed by: OPTOMETRIST

## 2019-05-28 PROCEDURE — 92015 DETERMINE REFRACTIVE STATE: CPT | Mod: S$GLB,,, | Performed by: OPTOMETRIST

## 2019-05-28 PROCEDURE — 92015 PR REFRACTION: ICD-10-PCS | Mod: S$GLB,,, | Performed by: OPTOMETRIST

## 2019-05-28 PROCEDURE — 92014 PR EYE EXAM, EST PATIENT,COMPREHESV: ICD-10-PCS | Mod: S$GLB,,, | Performed by: OPTOMETRIST

## 2019-05-28 PROCEDURE — 99999 PR PBB SHADOW E&M-EST. PATIENT-LVL I: CPT | Mod: PBBFAC,,, | Performed by: OPTOMETRIST

## 2019-05-28 RX ORDER — AMOXICILLIN 500 MG/1
CAPSULE ORAL
Refills: 0 | COMMUNITY
Start: 2019-05-23 | End: 2021-10-14

## 2019-05-28 NOTE — PROGRESS NOTES
HPI     Diabetic Eye Exam      Additional comments: Yearly              Comments     Last seen by [facility] on 5/14/18 for yearly eye exam  Patient here today for yearly eye exam  Has noticeable changes in vision at distance and near since last eye exam  Wears SVL distance glasses for night driving or CTL  No other complaints  No drops  1. Long term use of Plaquenil  2. DM          Last edited by Dulce Moss, PCT on 5/28/2019 10:50 AM. (History)              Assessment /Plan     For exam results, see Encounter Report.    Long-term use of Plaquenil    Erosive osteoarthritis of hands, bilateral    Glaucoma screening    Myopia with presbyopia of both eyes      No evidence of plaquenil maculopathy.  Glaucoma screening and fundus exam normal.  Updated glasses prescription.  Return to clinic 1 yr.

## 2019-05-31 NOTE — MR AVS SNAPSHOT
[facility] - Ophthalmology  [address] [facility] [address] [place] [address]  Phone: [phone]  Fax: [phone]                  Alisa Millan   2017 3:15 PM   Office Visit    Description:  Female : 1955   Provider:  Treasure Hernández OD   Department:  [facility] Op[facility]mology           Reason for Visit     Diabetic Eye Exam     Blurred Vision           Diagnoses this Visit        Comments    Type 2 diabetes mellitus without complication, unspecified long term insulin use status    -  Primary     Glaucoma screening         Myopia with presbyopia, bilateral                To Do List           Goals (5 Years of Data)     None      Follow-Up and Disposition     Return in about 1 year (around 2018).      [facility]     [facility]  Assistance  Unless otherwise directed by your provider, please contact [facility], our nurse care line that is available for  assistance.     Registered nurses in the [facility] Center provide: appointment scheduling, clinical advisement, health education, and other advisory services.  Call: [phone] (toll free)               Medications           Message regarding Medications     Verify the changes and/or additions to your medication regime listed below are the same as discussed with your clinician today.  If any of these changes or additions are incorrect, please notify your healthcare provider.        STOP taking these medications     B-complex with vitamin C (Z-BEC OR EQUIV) tablet Take 1 tablet by mouth once daily.    promethazine-dextromethorphan (PROMETHAZINE-DM) 6.25-15 mg/5 mL Syrp take 5 milliliters (1 teaspoonful) by mouth four times a day if needed for cough           Verify that the below list of medications is an accurate representation of the medications you are currently taking.  If none reported, the list may be blank. If incorrect, please contact your healthcare provider. Carry this list with you in case of  emergency.           Current Medications     alprazolam (XANAX) 0.25 MG tablet     aspirin (ECOTRIN) 81 MG EC tablet Take 81 mg by mouth once daily.    atorvastatin (LIPITOR) 20 MG tablet Take 10 mg by mouth nightly.    cholecalciferol, vitamin D3, 50,000 unit capsule     cyanocobalamin, vitamin B-12, 1,000 mcg Subl Place 1 tablet under the tongue.    fexofenadine (ALLEGRA) 30 MG tablet Take 30 mg by mouth 2 (two) times daily.    fluoxetine (PROZAC) 20 MG capsule Take by mouth Daily.    hydrocodone-acetaminophen 10-325mg (NORCO)  mg Tab Take by mouth.    sitagliptan (JANUVIA) 100 MG Tab Take by mouth. 1 tablet Oral At bedtime    valsartan-hydrochlorothiazide (DIOVAN-HCT) 320-12.5 mg per tablet Take 1 tablet by mouth once daily.    levothyroxine (SYNTHROID) 50 MCG tablet Take 50 mcg by mouth.    montelukast (SINGULAIR) 10 mg tablet Take 10 mg by mouth every evening.           Clinical Reference Information           Allergies as of 5/2/2017     Meperidine      Immunizations Administered on Date of Encounter - 5/2/2017     None      Language Assistance Services     ATTENTION: Language assistance services are available, free of charge. Please call [phone].      ATENCIÓN: Si cristopher mile, tiene a brantley disposición servicios gratuitos de asistencia lingüística. Llame al [phone].     [facility] Ý: N?u b?n nói Ti?ng Vi?t, có các d?ch v? h? tr? ngôn ng? mi?n phí dành cho b?n. G?i s? [phone].         [facility] complies with applicable Federal civil rights laws and does not discriminate on the basis of race, color, national origin, age, disability, or sex.         geovani mensah RN to geovani mensah RN to betty roland rn to sulaiman montes

## 2020-05-27 ENCOUNTER — TELEPHONE (OUTPATIENT)
Dept: OBSTETRICS AND GYNECOLOGY | Facility: CLINIC | Age: 65
End: 2020-05-27

## 2020-05-27 DIAGNOSIS — Z12.31 ENCOUNTER FOR SCREENING MAMMOGRAM FOR BREAST CANCER: Primary | ICD-10-CM

## 2020-05-27 NOTE — TELEPHONE ENCOUNTER
Spoke with patient, patient requested an appointment for her annual mammogram. Appointment was scheduled for June 12 at 11am at the [place] location. Patient verbalized understanding.

## 2020-05-27 NOTE — TELEPHONE ENCOUNTER
----- Message from Chrissy Calvert sent at 5/27/2020  2:43 PM CDT -----  Contact: pt  States she needs to schedule her mammogram. Please call pt at [phone]. Thank you

## 2020-06-05 ENCOUNTER — OFFICE VISIT (OUTPATIENT)
Dept: OPHTHALMOLOGY | Facility: CLINIC | Age: 65
End: 2020-06-05
Payer: MEDICARE

## 2020-06-05 DIAGNOSIS — E11.9 TYPE 2 DIABETES MELLITUS WITHOUT RETINOPATHY: ICD-10-CM

## 2020-06-05 DIAGNOSIS — H52.13 MYOPIA WITH PRESBYOPIA OF BOTH EYES: ICD-10-CM

## 2020-06-05 DIAGNOSIS — H25.13 NUCLEAR SCLEROSIS, BILATERAL: ICD-10-CM

## 2020-06-05 DIAGNOSIS — M15.4 EROSIVE OSTEOARTHRITIS OF HANDS, BILATERAL: ICD-10-CM

## 2020-06-05 DIAGNOSIS — H52.4 MYOPIA WITH PRESBYOPIA OF BOTH EYES: ICD-10-CM

## 2020-06-05 DIAGNOSIS — Z79.899 LONG-TERM USE OF PLAQUENIL: Primary | ICD-10-CM

## 2020-06-05 PROCEDURE — 99999 PR PBB SHADOW E&M-EST. PATIENT-LVL I: ICD-10-PCS | Mod: PBBFAC,,, | Performed by: OPTOMETRIST

## 2020-06-05 PROCEDURE — 92014 PR EYE EXAM, EST PATIENT,COMPREHESV: ICD-10-PCS | Mod: S$GLB,,, | Performed by: OPTOMETRIST

## 2020-06-05 PROCEDURE — 92014 COMPRE OPH EXAM EST PT 1/>: CPT | Mod: S$GLB,,, | Performed by: OPTOMETRIST

## 2020-06-05 PROCEDURE — 92134 POSTERIOR SEGMENT OCT RETINA (OCULAR COHERENCE TOMOGRAPHY)-BOTH EYES: ICD-10-PCS | Mod: S$GLB,,, | Performed by: OPTOMETRIST

## 2020-06-05 PROCEDURE — 99999 PR PBB SHADOW E&M-EST. PATIENT-LVL I: CPT | Mod: PBBFAC,,, | Performed by: OPTOMETRIST

## 2020-06-05 PROCEDURE — 92015 DETERMINE REFRACTIVE STATE: CPT | Mod: S$GLB,,, | Performed by: OPTOMETRIST

## 2020-06-05 PROCEDURE — 92015 PR REFRACTION: ICD-10-PCS | Mod: S$GLB,,, | Performed by: OPTOMETRIST

## 2020-06-05 PROCEDURE — 92134 CPTRZ OPH DX IMG PST SGM RTA: CPT | Mod: S$GLB,,, | Performed by: OPTOMETRIST

## 2020-06-05 NOTE — PROGRESS NOTES
HPI     Eye Exam     Comments: Yearly              Comments     Patient last seen by [facility] on 05/28/2019 for yearly eye exam.  Previously seen by [facility] on 09/05/2013.  Plaquenil use: 1 year, 200 mg qd  Last 10-2 VF: 05/14/2018  Last mOCT: 06/05/2020    Diabetic eye exam  Diagnosed with diabetes Unsure  Recent vision fluctuations No  No results found for: LABA1C, HGBA1C    HPI    Any vision changes since last exam: Yes, trouble with reading small print   Eye pain: No  Other ocular symptoms: No    Do you wear currently wear glasses or contacts? Both    Interested in contacts today? Yes    Do you plan on getting new glasses today? Yes                    Last edited by Carmine Mcneil, OD on 6/5/2020  2:26 PM. (History)            Assessment /Plan     For exam results, see Encounter Report.    Long-term use of Plaquenil  -     Posterior Segment OCT Retina-Both eyes  Erosive osteoarthritis of hands, bilateral  -     Posterior Segment OCT Retina-Both eyes    Type 2 diabetes mellitus without retinopathy  No diabetic retinopathy in either eye  Continue close care with PCP   Monitor 12 months    Nuclear sclerosis, bilateral  Surgery is not indicated at this time.   Monitor 12 months.    Myopia with presbyopia of both eyes  Eyeglass Final Rx     Eyeglass Final Rx       Sphere Cylinder Axis    Right -1.50 +0.50 130    Left -1.25      Expiration Date:  6/6/2021              Contact Lens Prescription (6/5/2020)        Brand Base Curve Diameter Sphere    Right Acuvue Oasys 1 Day 8.50 14.3 -1.25    Left Acuvue Oasys 1 Day 8.50 14.3 -1.25    Expiration Date:  6/6/2021    Replacement:  Daily    Wearing Schedule:  Daily wear        No maculopathy present today.   Stressed importance of follow up visits with pt.  RTC 12 months for dilation,10-2 VF, and mOCT.   PRN sooner if any va changes.

## 2020-06-12 ENCOUNTER — HOSPITAL ENCOUNTER (OUTPATIENT)
Dept: RADIOLOGY | Facility: HOSPITAL | Age: 65
Discharge: HOME | End: 2020-06-12
Attending: OBSTETRICS & GYNECOLOGY
Payer: MEDICARE

## 2020-06-12 VITALS — BODY MASS INDEX: 39.39 KG/M2 | HEIGHT: 62 IN | WEIGHT: 214.06 LBS

## 2020-06-12 DIAGNOSIS — Z12.31 ENCOUNTER FOR SCREENING MAMMOGRAM FOR BREAST CANCER: ICD-10-CM

## 2020-06-12 PROCEDURE — 77063 MAMMO DIGITAL SCREENING BILAT WITH TOMOSYNTHESIS_CAD: ICD-10-PCS | Mod: 26,,, | Performed by: RADIOLOGY

## 2020-06-12 PROCEDURE — 77067 MAMMO DIGITAL SCREENING BILAT WITH TOMOSYNTHESIS_CAD: ICD-10-PCS | Mod: 26,,, | Performed by: RADIOLOGY

## 2020-06-12 PROCEDURE — 77067 SCR MAMMO BI INCL CAD: CPT | Mod: 26,,, | Performed by: RADIOLOGY

## 2020-06-12 PROCEDURE — 77063 BREAST TOMOSYNTHESIS BI: CPT | Mod: 26,,, | Performed by: RADIOLOGY

## 2020-06-12 PROCEDURE — 77067 SCR MAMMO BI INCL CAD: CPT | Mod: TC

## 2021-08-10 ENCOUNTER — OFFICE VISIT (OUTPATIENT)
Dept: OPHTHALMOLOGY | Facility: CLINIC | Age: 66
End: 2021-08-10
Payer: MEDICARE

## 2021-08-10 DIAGNOSIS — H52.4 MYOPIA WITH PRESBYOPIA OF BOTH EYES: ICD-10-CM

## 2021-08-10 DIAGNOSIS — H25.13 NUCLEAR SCLEROSIS, BILATERAL: ICD-10-CM

## 2021-08-10 DIAGNOSIS — Z79.899 LONG-TERM USE OF PLAQUENIL: ICD-10-CM

## 2021-08-10 DIAGNOSIS — M15.4 EROSIVE OSTEOARTHRITIS OF HANDS, BILATERAL: Primary | ICD-10-CM

## 2021-08-10 DIAGNOSIS — E11.36 DIABETIC CATARACT OF BOTH EYES: ICD-10-CM

## 2021-08-10 DIAGNOSIS — E11.9 TYPE 2 DIABETES MELLITUS WITHOUT RETINOPATHY: ICD-10-CM

## 2021-08-10 DIAGNOSIS — H52.13 MYOPIA WITH PRESBYOPIA OF BOTH EYES: ICD-10-CM

## 2021-08-10 PROCEDURE — 1159F MED LIST DOCD IN RCRD: CPT | Mod: CPTII,S$GLB,, | Performed by: OPTOMETRIST

## 2021-08-10 PROCEDURE — 92015 PR REFRACTION: ICD-10-PCS | Mod: S$GLB,,, | Performed by: OPTOMETRIST

## 2021-08-10 PROCEDURE — 92014 PR EYE EXAM, EST PATIENT,COMPREHESV: ICD-10-PCS | Mod: S$GLB,,, | Performed by: OPTOMETRIST

## 2021-08-10 PROCEDURE — 92134 POSTERIOR SEGMENT OCT RETINA (OCULAR COHERENCE TOMOGRAPHY)-BOTH EYES: ICD-10-PCS | Mod: S$GLB,,, | Performed by: OPTOMETRIST

## 2021-08-10 PROCEDURE — 1159F PR MEDICATION LIST DOCUMENTED IN MEDICAL RECORD: ICD-10-PCS | Mod: CPTII,S$GLB,, | Performed by: OPTOMETRIST

## 2021-08-10 PROCEDURE — 2023F DILAT RTA XM W/O RTNOPTHY: CPT | Mod: CPTII,S$GLB,, | Performed by: OPTOMETRIST

## 2021-08-10 PROCEDURE — 1160F PR REVIEW ALL MEDS BY PRESCRIBER/CLIN PHARMACIST DOCUMENTED: ICD-10-PCS | Mod: CPTII,S$GLB,, | Performed by: OPTOMETRIST

## 2021-08-10 PROCEDURE — 1160F RVW MEDS BY RX/DR IN RCRD: CPT | Mod: CPTII,S$GLB,, | Performed by: OPTOMETRIST

## 2021-08-10 PROCEDURE — 92134 CPTRZ OPH DX IMG PST SGM RTA: CPT | Mod: S$GLB,,, | Performed by: OPTOMETRIST

## 2021-08-10 PROCEDURE — 2023F PR DILATED RETINAL EXAM W/O EVID OF RETINOPATHY: ICD-10-PCS | Mod: CPTII,S$GLB,, | Performed by: OPTOMETRIST

## 2021-08-10 PROCEDURE — 92015 DETERMINE REFRACTIVE STATE: CPT | Mod: S$GLB,,, | Performed by: OPTOMETRIST

## 2021-08-10 PROCEDURE — 99999 PR PBB SHADOW E&M-EST. PATIENT-LVL III: CPT | Mod: PBBFAC,,, | Performed by: OPTOMETRIST

## 2021-08-10 PROCEDURE — 92014 COMPRE OPH EXAM EST PT 1/>: CPT | Mod: S$GLB,,, | Performed by: OPTOMETRIST

## 2021-08-10 PROCEDURE — 99999 PR PBB SHADOW E&M-EST. PATIENT-LVL III: ICD-10-PCS | Mod: PBBFAC,,, | Performed by: OPTOMETRIST

## 2021-10-11 ENCOUNTER — LAB VISIT (OUTPATIENT)
Dept: LAB | Facility: HOSPITAL | Age: 66
End: 2021-10-11
Attending: NURSE PRACTITIONER
Payer: MEDICARE

## 2021-10-11 ENCOUNTER — OFFICE VISIT (OUTPATIENT)
Dept: OBSTETRICS AND GYNECOLOGY | Facility: CLINIC | Age: 66
End: 2021-10-11
Payer: MEDICARE

## 2021-10-11 VITALS
BODY MASS INDEX: 37.45 KG/M2 | WEIGHT: 203.5 LBS | SYSTOLIC BLOOD PRESSURE: 132 MMHG | DIASTOLIC BLOOD PRESSURE: 78 MMHG | HEIGHT: 62 IN

## 2021-10-11 DIAGNOSIS — R30.0 DYSURIA: ICD-10-CM

## 2021-10-11 DIAGNOSIS — Z11.3 SCREEN FOR STD (SEXUALLY TRANSMITTED DISEASE): ICD-10-CM

## 2021-10-11 DIAGNOSIS — N39.0 RECURRENT UTI: ICD-10-CM

## 2021-10-11 DIAGNOSIS — Z11.3 SCREEN FOR STD (SEXUALLY TRANSMITTED DISEASE): Primary | ICD-10-CM

## 2021-10-11 DIAGNOSIS — N95.2 VAGINAL ATROPHY: ICD-10-CM

## 2021-10-11 PROCEDURE — 80074 ACUTE HEPATITIS PANEL: CPT | Performed by: NURSE PRACTITIONER

## 2021-10-11 PROCEDURE — 1160F PR REVIEW ALL MEDS BY PRESCRIBER/CLIN PHARMACIST DOCUMENTED: ICD-10-PCS | Mod: CPTII,S$GLB,, | Performed by: NURSE PRACTITIONER

## 2021-10-11 PROCEDURE — 99214 OFFICE O/P EST MOD 30 MIN: CPT | Mod: 25,S$GLB,, | Performed by: NURSE PRACTITIONER

## 2021-10-11 PROCEDURE — 87591 N.GONORRHOEAE DNA AMP PROB: CPT | Mod: 59 | Performed by: NURSE PRACTITIONER

## 2021-10-11 PROCEDURE — 87481 CANDIDA DNA AMP PROBE: CPT | Mod: 59 | Performed by: NURSE PRACTITIONER

## 2021-10-11 PROCEDURE — 99999 PR PBB SHADOW E&M-EST. PATIENT-LVL III: CPT | Mod: PBBFAC,,, | Performed by: NURSE PRACTITIONER

## 2021-10-11 PROCEDURE — 81002 PR URINALYSIS NONAUTO W/O SCOPE: ICD-10-PCS | Mod: S$GLB,,, | Performed by: NURSE PRACTITIONER

## 2021-10-11 PROCEDURE — 81002 URINALYSIS NONAUTO W/O SCOPE: CPT | Mod: S$GLB,,, | Performed by: NURSE PRACTITIONER

## 2021-10-11 PROCEDURE — 1159F PR MEDICATION LIST DOCUMENTED IN MEDICAL RECORD: ICD-10-PCS | Mod: CPTII,S$GLB,, | Performed by: NURSE PRACTITIONER

## 2021-10-11 PROCEDURE — 1159F MED LIST DOCD IN RCRD: CPT | Mod: CPTII,S$GLB,, | Performed by: NURSE PRACTITIONER

## 2021-10-11 PROCEDURE — 87491 CHLMYD TRACH DNA AMP PROBE: CPT | Mod: 59 | Performed by: NURSE PRACTITIONER

## 2021-10-11 PROCEDURE — 3008F PR BODY MASS INDEX (BMI) DOCUMENTED: ICD-10-PCS | Mod: CPTII,S$GLB,, | Performed by: NURSE PRACTITIONER

## 2021-10-11 PROCEDURE — 3078F DIAST BP <80 MM HG: CPT | Mod: CPTII,S$GLB,, | Performed by: NURSE PRACTITIONER

## 2021-10-11 PROCEDURE — 3075F PR MOST RECENT SYSTOLIC BLOOD PRESS GE 130-139MM HG: ICD-10-PCS | Mod: CPTII,S$GLB,, | Performed by: NURSE PRACTITIONER

## 2021-10-11 PROCEDURE — 87088 URINE BACTERIA CULTURE: CPT | Performed by: NURSE PRACTITIONER

## 2021-10-11 PROCEDURE — 87086 URINE CULTURE/COLONY COUNT: CPT | Performed by: NURSE PRACTITIONER

## 2021-10-11 PROCEDURE — 3288F FALL RISK ASSESSMENT DOCD: CPT | Mod: CPTII,S$GLB,, | Performed by: NURSE PRACTITIONER

## 2021-10-11 PROCEDURE — 99214 PR OFFICE/OUTPT VISIT, EST, LEVL IV, 30-39 MIN: ICD-10-PCS | Mod: 25,S$GLB,, | Performed by: NURSE PRACTITIONER

## 2021-10-11 PROCEDURE — 87389 HIV-1 AG W/HIV-1&-2 AB AG IA: CPT | Performed by: NURSE PRACTITIONER

## 2021-10-11 PROCEDURE — 99999 PR PBB SHADOW E&M-EST. PATIENT-LVL III: ICD-10-PCS | Mod: PBBFAC,,, | Performed by: NURSE PRACTITIONER

## 2021-10-11 PROCEDURE — 3008F BODY MASS INDEX DOCD: CPT | Mod: CPTII,S$GLB,, | Performed by: NURSE PRACTITIONER

## 2021-10-11 PROCEDURE — 36415 COLL VENOUS BLD VENIPUNCTURE: CPT | Performed by: NURSE PRACTITIONER

## 2021-10-11 PROCEDURE — 3075F SYST BP GE 130 - 139MM HG: CPT | Mod: CPTII,S$GLB,, | Performed by: NURSE PRACTITIONER

## 2021-10-11 PROCEDURE — 1160F RVW MEDS BY RX/DR IN RCRD: CPT | Mod: CPTII,S$GLB,, | Performed by: NURSE PRACTITIONER

## 2021-10-11 PROCEDURE — 3078F PR MOST RECENT DIASTOLIC BLOOD PRESSURE < 80 MM HG: ICD-10-PCS | Mod: CPTII,S$GLB,, | Performed by: NURSE PRACTITIONER

## 2021-10-11 PROCEDURE — 3072F LOW RISK FOR RETINOPATHY: CPT | Mod: CPTII,S$GLB,, | Performed by: NURSE PRACTITIONER

## 2021-10-11 PROCEDURE — 3288F PR FALLS RISK ASSESSMENT DOCUMENTED: ICD-10-PCS | Mod: CPTII,S$GLB,, | Performed by: NURSE PRACTITIONER

## 2021-10-11 PROCEDURE — 86592 SYPHILIS TEST NON-TREP QUAL: CPT | Performed by: NURSE PRACTITIONER

## 2021-10-11 PROCEDURE — 3072F PR LOW RISK FOR RETINOPATHY: ICD-10-PCS | Mod: CPTII,S$GLB,, | Performed by: NURSE PRACTITIONER

## 2021-10-11 PROCEDURE — 1101F PR PT FALLS ASSESS DOC 0-1 FALLS W/OUT INJ PAST YR: ICD-10-PCS | Mod: CPTII,S$GLB,, | Performed by: NURSE PRACTITIONER

## 2021-10-11 PROCEDURE — 1101F PT FALLS ASSESS-DOCD LE1/YR: CPT | Mod: CPTII,S$GLB,, | Performed by: NURSE PRACTITIONER

## 2021-10-11 RX ORDER — ESTRADIOL 0.1 MG/G
CREAM VAGINAL
Qty: 42.5 G | Refills: 10 | Status: SHIPPED | OUTPATIENT
Start: 2021-10-11

## 2021-10-12 LAB
C TRACH DNA SPEC QL NAA+PROBE: NOT DETECTED
HAV IGM SERPL QL IA: NEGATIVE
HBV CORE IGM SERPL QL IA: NEGATIVE
HBV SURFACE AG SERPL QL IA: NEGATIVE
HCV AB SERPL QL IA: NEGATIVE
HIV 1+2 AB+HIV1 P24 AG SERPL QL IA: NEGATIVE
N GONORRHOEA DNA SPEC QL NAA+PROBE: NOT DETECTED
RPR SER QL: NORMAL

## 2021-10-13 LAB
BACTERIA UR CULT: ABNORMAL
BACTERIAL VAGINOSIS DNA: NEGATIVE
CANDIDA GLABRATA DNA: NEGATIVE
CANDIDA KRUSEI DNA: NEGATIVE
CANDIDA RRNA VAG QL PROBE: NEGATIVE
T VAGINALIS RRNA GENITAL QL PROBE: NEGATIVE

## 2021-10-14 ENCOUNTER — TELEPHONE (OUTPATIENT)
Dept: OBSTETRICS AND GYNECOLOGY | Facility: CLINIC | Age: 66
End: 2021-10-14

## 2021-10-14 DIAGNOSIS — N30.00 ACUTE CYSTITIS WITHOUT HEMATURIA: Primary | ICD-10-CM

## 2021-10-14 RX ORDER — AMOXICILLIN 875 MG/1
875 TABLET, FILM COATED ORAL 2 TIMES DAILY
Qty: 14 TABLET | Refills: 0 | Status: SHIPPED | OUTPATIENT
Start: 2021-10-14 | End: 2021-10-21

## 2023-07-10 ENCOUNTER — PATIENT MESSAGE (OUTPATIENT)
Dept: OPHTHALMOLOGY | Facility: CLINIC | Age: 68
End: 2023-07-10

## 2023-07-10 ENCOUNTER — OFFICE VISIT (OUTPATIENT)
Dept: OPHTHALMOLOGY | Facility: CLINIC | Age: 68
End: 2023-07-10
Payer: MEDICARE

## 2023-07-10 DIAGNOSIS — E11.9 TYPE 2 DIABETES MELLITUS WITHOUT RETINOPATHY: Primary | ICD-10-CM

## 2023-07-10 DIAGNOSIS — H52.13 MYOPIA WITH PRESBYOPIA OF BOTH EYES: ICD-10-CM

## 2023-07-10 DIAGNOSIS — H52.4 MYOPIA WITH PRESBYOPIA OF BOTH EYES: ICD-10-CM

## 2023-07-10 DIAGNOSIS — H25.012 CORTICAL AGE-RELATED CATARACT OF LEFT EYE: ICD-10-CM

## 2023-07-10 DIAGNOSIS — H25.13 NUCLEAR SCLEROSIS, BILATERAL: ICD-10-CM

## 2023-07-10 DIAGNOSIS — E11.36 DIABETIC CATARACT OF BOTH EYES: ICD-10-CM

## 2023-07-10 PROCEDURE — 2023F DILAT RTA XM W/O RTNOPTHY: CPT | Mod: CPTII,S$GLB,, | Performed by: OPTOMETRIST

## 2023-07-10 PROCEDURE — 99999 PR PBB SHADOW E&M-EST. PATIENT-LVL III: CPT | Mod: PBBFAC,,, | Performed by: OPTOMETRIST

## 2023-07-10 PROCEDURE — 1160F RVW MEDS BY RX/DR IN RCRD: CPT | Mod: CPTII,S$GLB,, | Performed by: OPTOMETRIST

## 2023-07-10 PROCEDURE — 92015 DETERMINE REFRACTIVE STATE: CPT | Mod: S$GLB,,, | Performed by: OPTOMETRIST

## 2023-07-10 PROCEDURE — 92015 PR REFRACTION: ICD-10-PCS | Mod: S$GLB,,, | Performed by: OPTOMETRIST

## 2023-07-10 PROCEDURE — 99999 PR PBB SHADOW E&M-EST. PATIENT-LVL III: ICD-10-PCS | Mod: PBBFAC,,, | Performed by: OPTOMETRIST

## 2023-07-10 PROCEDURE — 92014 PR EYE EXAM, EST PATIENT,COMPREHESV: ICD-10-PCS | Mod: S$GLB,,, | Performed by: OPTOMETRIST

## 2023-07-10 PROCEDURE — 2023F PR DILATED RETINAL EXAM W/O EVID OF RETINOPATHY: ICD-10-PCS | Mod: CPTII,S$GLB,, | Performed by: OPTOMETRIST

## 2023-07-10 PROCEDURE — 92014 COMPRE OPH EXAM EST PT 1/>: CPT | Mod: S$GLB,,, | Performed by: OPTOMETRIST

## 2023-07-10 PROCEDURE — 1159F MED LIST DOCD IN RCRD: CPT | Mod: CPTII,S$GLB,, | Performed by: OPTOMETRIST

## 2023-07-10 PROCEDURE — 1160F PR REVIEW ALL MEDS BY PRESCRIBER/CLIN PHARMACIST DOCUMENTED: ICD-10-PCS | Mod: CPTII,S$GLB,, | Performed by: OPTOMETRIST

## 2023-07-10 PROCEDURE — 1159F PR MEDICATION LIST DOCUMENTED IN MEDICAL RECORD: ICD-10-PCS | Mod: CPTII,S$GLB,, | Performed by: OPTOMETRIST

## 2023-07-10 NOTE — PROGRESS NOTES
HPI     Diabetic Eye Exam            Comments: Diagnosed with diabetes unsure  Lab Results       Component                Value               Date                       HGBA1C                   5.4                 04/17/2023              Vision changes since last eye exam?: not really, little fuzzy sometimes   started recently   Any eye pain today: no  Other ocular symptoms: no  Interested in contact lens fitting today? Yes     Plaquenil use: no longer taking as of 2023  Last 10-2 VF: 8/10/21  Last mOCT: 8/10/21           Last edited by Beth Rankin MA on 7/10/2023  2:12 PM.            Assessment /Plan     For exam results, see Encounter Report.    Type 2 diabetes mellitus without retinopathy  There was no diabetic retinopathy present in either eye today.   Recommended that pt continue care with PCP and/or specialists regarding diabetes.  Follow-up dilated eye exam recommended in 12 months, sooner with any vision changes or new concerns.    Nuclear sclerosis, bilateral  Cortical age-related cataract of left eye  Diabetic cataract of both eyes  Cataracts not significantly affecting activities of daily living and therefore surgery is not indicated at this time.   Will continue to monitor over the next 12 months. Pt to call or RTC with any significant change in vision prior to next visit.     Myopia with presbyopia of both eyes  Eyeglass Final Rx       Eyeglass Final Rx         Sphere    Right -1.25    Left -1.25      Expiration Date: 7/10/2024                  Contact Lens Prescription (7/10/2023)          Brand Base Curve Diameter Sphere    Right Acuvue Oasys 1 Day 8.50 14.3 -1.25    Left Acuvue Oasys 1 Day 8.50 14.3 -1.25      Expiration Date: 7/10/2024    Replacement: Daily    Wearing Schedule: Daily Wear              RTC 1 yr for dilated eye exam or PRN if any problems.   Discussed above and answered questions.

## 2025-03-31 ENCOUNTER — OFFICE VISIT (OUTPATIENT)
Dept: OPHTHALMOLOGY | Facility: CLINIC | Age: 70
End: 2025-03-31
Payer: MEDICARE

## 2025-03-31 DIAGNOSIS — E11.9 TYPE 2 DIABETES MELLITUS WITHOUT RETINOPATHY: Primary | ICD-10-CM

## 2025-03-31 DIAGNOSIS — H43.813 POSTERIOR VITREOUS DETACHMENT OF BOTH EYES: ICD-10-CM

## 2025-03-31 DIAGNOSIS — H52.4 MYOPIA WITH PRESBYOPIA OF BOTH EYES: ICD-10-CM

## 2025-03-31 DIAGNOSIS — H25.13 NUCLEAR SCLEROSIS, BILATERAL: ICD-10-CM

## 2025-03-31 DIAGNOSIS — E11.36 DIABETIC CATARACT OF BOTH EYES: ICD-10-CM

## 2025-03-31 DIAGNOSIS — H52.13 MYOPIA WITH PRESBYOPIA OF BOTH EYES: ICD-10-CM

## 2025-03-31 DIAGNOSIS — H25.012 CORTICAL AGE-RELATED CATARACT OF LEFT EYE: ICD-10-CM

## 2025-03-31 PROCEDURE — 92014 COMPRE OPH EXAM EST PT 1/>: CPT | Mod: ,,, | Performed by: OPTOMETRIST

## 2025-03-31 PROCEDURE — 99999 PR PBB SHADOW E&M-EST. PATIENT-LVL I: CPT | Mod: PBBFAC,,, | Performed by: OPTOMETRIST

## 2025-03-31 PROCEDURE — 92015 DETERMINE REFRACTIVE STATE: CPT | Mod: ,,, | Performed by: OPTOMETRIST

## 2025-03-31 NOTE — PROGRESS NOTES
HPI     Diabetic Eye Exam            Comments: RTC 1 yr for dilated eye exam   Vision changes since last eye exam?: no. Can see well without contacts   also    Any eye pain today: no    Other ocular symptoms: no    Interested in contact lens fitting today? yes                     Last edited by Teena Montoya on 3/31/2025  8:32 AM.            Assessment /Plan     For exam results, see Encounter Report.    Type 2 diabetes mellitus without retinopathy  There was no diabetic retinopathy present in either eye today.   Recommended that pt continue care with PCP and/or specialists regarding diabetes.  Follow-up dilated eye exam recommended in 12 months, sooner with any vision changes or new concerns.    Nuclear sclerosis, bilateral  Cortical age-related cataract of left eye  Diabetic cataract of both eyes  Cataracts not significantly affecting activities of daily living and therefore surgery is not indicated at this time.   Will continue to monitor over the next 12 months. Pt to call or RTC with any significant change in vision prior to next visit.     Poor view, undilated.       Myopia with presbyopia of both eyes  Eyeglass Final Rx       Eyeglass Final Rx         Sphere Cylinder    Right -1.25 Sphere    Left -1.00 Sphere      Type: SVL: distance    Expiration Date: 3/31/2026                  Contact Lens Prescription (3/31/2025)          Brand Base Curve Diameter Sphere Cylinder    Right Acuvue Oasys 1 Day 8.50 14.3 -1.25 Sphere    Left Acuvue Oasys 1 Day 8.50 14.3 -1.00 Sphere      Expiration Date: 3/31/2026    Replacement: Daily    Wearing Schedule: Daily Wear   Ok to wear only one lens at a time       Dispensed trial contact lenses today. Patient is to wear lenses for 1 week.   Ok to order supply if no problems. RTC PRN if any problems arise.      Otherwise, RTC 1 yr for dilated eye exam with gOCT or PRN if any problems.   Discussed above and answered questions.

## 2025-05-21 DIAGNOSIS — M54.50 LOW BACK PAIN: ICD-10-CM

## 2025-05-21 DIAGNOSIS — M53.3 SI (SACROILIAC) JOINT DYSFUNCTION: ICD-10-CM

## 2025-05-21 DIAGNOSIS — M51.9 LUMBAR DISC DISORDER: Primary | ICD-10-CM

## 2025-05-27 ENCOUNTER — CLINICAL SUPPORT (OUTPATIENT)
Dept: REHABILITATION | Facility: HOSPITAL | Age: 70
End: 2025-05-27
Payer: MEDICARE

## 2025-05-27 DIAGNOSIS — R29.898 WEAKNESS OF BOTH LOWER EXTREMITIES: ICD-10-CM

## 2025-05-27 DIAGNOSIS — M54.42 CHRONIC BILATERAL LOW BACK PAIN WITH LEFT-SIDED SCIATICA: Primary | ICD-10-CM

## 2025-05-27 DIAGNOSIS — R26.9 GAIT ABNORMALITY: ICD-10-CM

## 2025-05-27 DIAGNOSIS — M54.17 L-S RADICULOPATHY: ICD-10-CM

## 2025-05-27 DIAGNOSIS — G89.29 CHRONIC BILATERAL LOW BACK PAIN WITH LEFT-SIDED SCIATICA: Primary | ICD-10-CM

## 2025-05-27 PROBLEM — M54.50 LOW BACK PAIN: Status: ACTIVE | Noted: 2025-05-27

## 2025-05-27 PROCEDURE — 97140 MANUAL THERAPY 1/> REGIONS: CPT

## 2025-05-27 PROCEDURE — 97112 NEUROMUSCULAR REEDUCATION: CPT

## 2025-05-27 PROCEDURE — 97110 THERAPEUTIC EXERCISES: CPT

## 2025-05-27 NOTE — PROGRESS NOTES
Outpatient Rehab    Physical Therapy Evaluation    Patient Name: Alisa Millan  MRN: [id number]  YOB: 1955  Encounter Date: 5/27/2025    Therapy Diagnosis:   Encounter Diagnoses   Name Primary?    Chronic bilateral low back pain with left-sided sciatica Yes    Weakness of both lower extremities     Gait abnormality     L-S radiculopathy      Physician: Sarwat Carreno MD    Physician Orders: Eval and Treat  Medical Diagnosis: Lumbar disc disorder  SI (sacroiliac) joint dysfunction  Low back pain    Visit # / Visits Authorized:  1 / 1  Insurance Authorization Period: 5/21/2025 to 12/31/2025  Date of Evaluation: 5/27/2025  Plan of Care Certification: 5/27/2025 to 8/19/2025     Time In: 1400   Time Out: 1505  Total Time (in minutes): 75   Total Billable Time (in minutes): 55    Intake Outcome Measure for FOTO Survey    Therapist reviewed FOTO scores for Alisa Millan on 5/27/2025.   FOTO report - see Media section or FOTO account episode details.     Intake Score: 49%    Precautions:       Subjective   History of Present Illness  Alisa is a 70 y.o. female who reports to physical therapy with a chief concern of lower back pain, left sided worse with left. Occassional radicular s/s such as n/t and pain reported down post aspect of left LE.     The patient reports a medical diagnosis of Lumbar disc disorder, SI (sacroiliac) joint dysfunction, Low back pain.            Dominant Hand: Right  History of Present Condition/Illness: Pt presents to PT clinic with primary c/o lower  back pain, left sided worse than right. Such condition is Chronic in nature, reporting onset several years ago (10+ year hx). WENDY: None, rather reports a gradual, insidious onset that has been intermittent and worsening with time.  . Past medical management of such condition has been managed per Dr. Wai MD.  Imaging conducted includes: X-ray of LS with finding(s) of typical OA/DDD.  Medical treatment for such condition has  involved pain medication, past injections, and past physical therapy. Pt reports moderate relief with such treatments provided, however reports a recent flare up/exacerbation of symptoms within the last couple months. Pt was most recently seen at [facility], initation of such treatment ~ one month ago. However, patient has now transitioned to [facility] for continuation of care for above noted lumbosacral condition. Patient motivated and eager to re-initiate PT tx with goal to reduce pain and improve functional mobility needed to allow for optimal participation and performance in household, community ADLs/IADLs, work related job duties, and recreational/social activities.           Past Medical History/Physical Systems Review:   Alisa Millan  has a past medical history of Allergy, Anxiety, Clotting disorder, Depression, GERD (gastroesophageal reflux disease), History of uterine fibroid, Hypertension, and Insulin resistance.    Alisa Millan  has a past surgical history that includes Knee surgery and  section, classic.    Alisa has a current medication list which includes the following prescription(s): alprazolam, aspirin, atorvastatin, cholecalciferol (vitamin d3), ciprofloxacin hcl, cyanocobalamin (vitamin b-12), estradiol, fexofenadine, fluarix quad [id number] (pf), fluoxetine, furosemide, hydrocodone-acetaminophen, meclizine, ondansetron, sitagliptin phosphate, valsartan-hydrochlorothiazide, and xarelto.    Review of patient's allergies indicates:   Allergen Reactions    Nitrofurantoin monohyd/m-cryst Itching and Swelling    Meperidine      Other reaction(s): Flushing (skin)  Other reaction(s): Difficulty breathing    Meperidine (pf)      Other Reaction(s): Flushing (skin)        Objective          Posture:  Patient presents with postural abnormalities which include:    [x] Forward Head   [] Increased Lumbar Lordosis   [x] Rounded Shoulder   [x] Flat Back Posture   [] Increased Thoracic  Kyphosis [] Pes Planus   [] Increased Trunk Sway  [] Valgus knee position   [] Increased Trunk Rotation  [] Varus knee position   [] Increased cervical lordosis [] Other:    Sensation:    Sensation to light touch over UE's is  [x] Intact [] Impaired [] Defer  Sensation to light touch over LE's is  [x] Intact [] Impaired [] Defer      Gait Analysis: The patient ambulated with the following assistive device: straight cane; the patient presents with the following gait abnormalities: unsteady gait, decreased step lengths bilaterally, fwd trunk lean, decreased gait speed, and obvious antalgia/guarding throughout.        Functional Tests  Outcome Norms   Timed Up and Go 22 seconds  <13.5 sec   30 second Sit to Stand N/a Age Male Female   60-64 <14 <12   65-69 <12 <11   70-74 <12 <10   75-79 <11 <10   80-84 <10 <9   85-89 <8 <8   90-93 <7 <4      Five Time Sit to Stand    Greater than 14 sec high risk  12.1-14 sec increased risk  12 sec or less low risk 18  20-29 yrs ? 6.0±1.4 sec.  30-39 yrs ? 6.1±1.4 sec.  40-49 yrs ? 7.6±1.8 sec.  50-59 yrs ? 7.7±2.6 sec.  60-69 yrs ? 8.4±0.0 sec (male), 12.7±1.8 sec (female)  70-79 yrs ? 11.6±3.4 sec (male), 13.0±4.8 sec (female)  80-89 yrs ? 16.7±4.5 sec (male), 17.2±5.5 sec (female)   6 minutes walk N/a 60s: >538f, 572m  70s: >471f, 527m  80s: >417f, 392m        RANGE OF MOTION:   Lumbar  (% of full range) Right  (spine) Left   Pain/Dysfunction with Movement Goal    Lumbar Flexion  60% ---  80%   Lumbar Extension MABEL ---  10%   Lumbar Side Bending 50% 50%  75%   Lumbar Rotation 50% 50%  75%            STRENGTH: (* denotes pain)  L/E MMT Right  (spine) Left Dysfunction with Movement Goal   Modified (90/90) Abdominal Strength   ---     Hip Flexion  4-/5 4-/5  4+/5 B   Hip Extension  3+/5 3+/5  4+/5 B   Hip Abduction  4-/5 3+/5  4+/5 B   Knee Extension 4/5 4/5  5/5 B   Knee Flexion 4/5 4/5  5/5 B   Hip IR 4-/5 4-/5  4+/5 B   Hip ER 4/5 4/5  4+/5 B   Ankle DF 4/5 4/5  5/5 B   Ankle PF  4+/5 4+/5  5/5 B   Ankle Inversion    5/5 B   Ankle Eversion    5/5 B        MUSCLE LENGTH:   Muscle Tested  Right Left  Limitation Goal   Hip Flexors [] Normal  [x] Limited [] Normal  [x] Limited  Normal B   ITB / TFL [] Normal  [] Limited [] Normal  [] Limited  Normal B   Quadriceps [] Normal  [] Limited [] Normal  [] Limited  Normal B   Hamstrings  [] Normal  [] Limited [] Normal  [] Limited  Normal B   Piriformis  [] Normal  [x] Limited [] Normal  [x] Limited  Normal B   Gastrocnemius  [] Normal  [] Limited [] Normal  [] Limited  Normal B   Soleus  [] Normal  [] Limited [] Normal  [] Limited  Normal B          JOINT MOBILITY:   Joint Motion  Right Mobility  (spine) Left Mobility Goal   Thoracic PA  [] Hypo     [] Normal     [] Hyper --- Normal   Costotransverse  [] Hypo     [] Normal     [] Hyper [] Hypo     [] Normal     [] Hyper Normal    Lumbar PA [x] Hypo     [] Normal     [] Hyper --- Normal   Lumbar Unilat. PA [x] Hypo     [] Normal     [] Hyper [] Hypo     [] Normal     [] Hyper Normal   Hip:  [] Hypo     [] Normal     [] Hyper [] Hypo     [] Normal     [] Hyper Normal   SIJ:  [] Hypo     [] Normal     [] Hyper [] Hypo     [] Normal     [] Hyper Normal         SPECIAL TESTS:    Right  (spine) Left  Goal   Lumbar Distraction  [] Positive     [] Negative --- Negative B    Lumbar Compression [] Positive     [] Negative --- Negative B    SLUMP [] Positive     [] Negative [x] Positive     [] Negative Negative B    Straight Leg Raise [] Positive     [] Negative [x] Positive     [] Negative Negative B    ASIS Compression [x] Positive     [] Negative --- Negative    ASIS Distraction  [] Positive     [] Negative --- Negative    Posterior Shear [] Positive     [] Negative [] Positive     [] Negative Negative B    KEMAR [x] Positive     [] Negative [] Positive     [] Negative Negative B           SENSATION:  Intact to Light Touch        POSTURE:   Pt presents with postural abnormalities which include:  "      PALPATION: Increased tenderness to palpation of: LS PSM (bilat), QL (bilat), PSIS (bilat), post hip mm (piriformis, glut max, glut med)        FUNCTIONAL MOVEMENT PATTERNS  Movement Analysis Notes   Bed Mobility  DYSFUNCTIONAL, PAINFUL     Transfers DYSFUNCTIONAL, PAINFUL     Sit to Stand DYSFUNCTIONAL, PAINFUL     Squat DYSFUNCTIONAL, PAINFUL     Single Leg Squat      Step Down      Single leg calf raises   R:   L:        Treatment:     CPT Intervention Performed   Today Duration / Intensity     Mat Exercises        TE SKTC right only  x 10x10"   TE DKTC with ball  x 10"10"    TE HS stretch with strap  x 3x30" ea    NMRE TA with bridge x 2x10   TE Supine clams  x GTB, 2x10    NMRE TA with leg extensions  x 6x6   NMRE 90/90 TA with pull downs x 2x10   NMRE 90/90 TA with heel taps  x 6x6   TE FRANCISCO JAVIER press ups x 10x10"               FLOOR EXERCISES        TE Recumbent bike  x 10 min   TE Ball roll outs  x 10, 5, 5 - 10" holds    NMRE Seated pallof press  x 2x10 ea   TE  Shuttle    3 cords                                    Manual Therapy       MT SI MET  Neural flossing, left LE       PLAN               CPT Codes available for Billing:   (10) minutes of Manual therapy (MT) to improve pain and ROM.  (30) minutes of Therapeutic Exercise (TE) to develop strength, endurance, range of motion, and flexibility.  (15) minutes of Neuromuscular Re-Education (NMR)  to improve: Balance, Coordination, Kinesthetic, Sense, Proprioception, and Posture.  (00) minutes of Therapeutic Activities (TA) to improve functional performance.  Vasopneumatic Device Therapy () for management of swelling/edema. (75510)  Unattended Electrical Stimulation (ES) for muscle performance or pain modulation.  BFR: Blood flow restriction applied during exercise          Assessment & Plan   Assessment  Alisa presents with a condition of Moderate complexity.   Presentation of Symptoms: Evolving  Will Comorbidities Impact Care: Yes        "     Prognosis: Good  Prognosis Details: Patient prognosis is Fair, if patient is consistent and compliant with Physical Therapy and home exercise program. Patient will benefit from skilled outpatient Physical Therapy to address the deficits stated above and in the chart below, provide patient/family education, and to maximize patient's level of independence.  Anticipated Barriers for therapy: chronicity of condition   Assessment Details: Alisa is a 70 y.o. female referred to outpatient Physical Therapy with a medical diagnosis of LS disc disorder and SIJ dysfunction. Upon performance of PT evaluation, s/s are consistent with MD findings. Physical limitations include expected limitations in lumbar spine ROM, functional weakness of BLE/core/spinal musculature, and postural abnormalities as outlined throughout evaluation. Neural s/s consisting of radicular complaints of pain, numbness, and/or tingling are present, most significantly noted along posterior aspect of left LE extending to calf. Functionally, patient presents with increased pain and difficulty standing/walking for prolonged durations of time, bending/squatting, twisting, lifting, stair climbing,  and performing various ADLs in home and community . In my professional opinion, patient will continue to benefit from skilled PT care needed to minimize pain complaints while maximizing functional strength and mobility needed to return to PLOF and allow for optimal participation/performance in household/community ADLs/IADLs, work related jobs duties/physical demands, and recreational/social activities.       Plan  From a physical therapy perspective, the patient would benefit from: Skilled Rehab Services                Visit Frequency: 2 times Per Week for 12 Weeks.       This plan was discussed with Patient.   Discussion participants: Agreed Upon Plan of Care  Plan details: Outpatient Physical Therapy to include any combination of the following interventions:  virtual visits, dry needling, modalities, electrical stimulation (IFC, Pre-Mod, Attended with Functional Dry Needling), Cervical/Lumbar Traction, Gait Training, Manual Therapy, Neuromuscular Re-ed, Patient Education, Self Care, Therapeutic Exercise, and Therapeutic Activites           The patient's spiritual, cultural, and educational needs were considered, and the patient is agreeable to the plan of care and goals.           Goals:   Active       LTGs       Pt will report worst pain of 4/10 in order to progress toward max functional ability and improve quality of life                Start:  05/27/25    Expected End:  08/19/25            Patient will demonstrate ability to ambulate mod I with cane x 500 feet, demonstrating normalization of gait mechanics including increased B herb through BLE , increased gait speed, min to no antalgia, and an upright posture observed throughout.        Start:  05/27/25    Expected End:  08/19/25            Patient will improve AROM to stated goals, listed in objective measures above, in order to return to maximal functional potential and improve quality of life.        Start:  05/27/25    Expected End:  08/19/25            Patient will improve strength to at least 4+/5 grossly,  in order to improve functional independence and quality of life.         Start:  05/27/25    Expected End:  08/19/25            Patient will demonstrate improved function as indicated by a score of greater than or equal to 54 out of 100 on FOTO.                Start:  05/27/25    Expected End:  08/19/25               STGs       Pt will report worst pain of 5/10 in order to progress toward max functional ability and improve quality of life                Start:  05/27/25    Expected End:  07/08/25            Patient will improve AROM to 50% of stated goals, listed in objective measures above, in order to progress towards independence with functional activities.         Start:  05/27/25    Expected End:   07/08/25            Patient will improve strength by 1/2 a grade, in order to progress towards independence with functional activities.                Start:  05/27/25    Expected End:  07/08/25            Patient will demonstrate the ability to ambulate mod I with cane x 250 feet (household ambulation distance) with improved gait mechanics as indicated by improved WB herb through BLE,  inc step lengths, improved upright posture, and increased gait speed.        Start:  05/27/25    Expected End:  07/08/25            Patient will demonstrate improved function as indicated by a score of greater than or equal to 52 out of 100 on FOTO.                Start:  05/27/25    Expected End:  08/19/25                Ash Fajardo, PT

## 2025-05-27 NOTE — LETTER
May 27, 2025  Sawrat Carreno MD  [address], [address]  [facility] [address]    To whom it may concern,     The attached plan of care is being sent to you for review and reference.    You may indicate your approval by signing the document electronically, or by faxing/mailing a signed copy of the final page of this document back to the attention of Ash Fajardo, PT:         Plan of Care 5/27/25   Effective from: 5/27/2025  Effective to: 8/19/2025    Plan ID: [id number]            Participants as of Finalize on 5/27/2025    Name Type Comments Contact Info    Sarwat Carreno MD Referring Provider  [phone]       Last Plan Note     Author: Ash Fajardo, PT Status: Addendum Last edited: 5/27/2025  2:00 PM         Outpatient Rehab    Physical Therapy Evaluation    Patient Name: Alisa Millan  MRN: [id number]  YOB: 1955  Encounter Date: 5/27/2025    Therapy Diagnosis:   Encounter Diagnoses   Name Primary?    Chronic bilateral low back pain with left-sided sciatica Yes    Weakness of both lower extremities     Gait abnormality     L-S radiculopathy      Physician: Sarwat Carreno MD    Physician Orders: Eval and Treat  Medical Diagnosis: Lumbar disc disorder  SI (sacroiliac) joint dysfunction  Low back pain    Visit # / Visits Authorized:  1 / 1  Insurance Authorization Period: 5/21/2025 to 12/31/2025  Date of Evaluation: 5/27/2025  Plan of Care Certification: 5/27/2025 to 8/19/2025     Time In: 1400   Time Out: 1505  Total Time (in minutes): 75   Total Billable Time (in minutes): 55    Intake Outcome Measure for FOTO Survey    Therapist reviewed FOTO scores for Alisa Millan on 5/27/2025.   FOTO report - see Media section or FOTO account episode details.     Intake Score: 49%    Precautions:       Subjective   History of Present Illness  Alisa is a 70 y.o. female who reports to physical therapy with a chief concern of lower back pain, left sided worse with left.  Occassional radicular s/s such as n/t and pain reported down post aspect of left LE.     The patient reports a medical diagnosis of Lumbar disc disorder, SI (sacroiliac) joint dysfunction, Low back pain.            Dominant Hand: Right  History of Present Condition/Illness: Pt presents to PT clinic with primary c/o lower  back pain, left sided worse than right. Such condition is Chronic in nature, reporting onset several years ago (10+ year hx). WENDY: None, rather reports a gradual, insidious onset that has been intermittent and worsening with time.  . Past medical management of such condition has been managed per Dr. Wai MD.  Imaging conducted includes: X-ray of LS with finding(s) of typical OA/DDD.  Medical treatment for such condition has involved pain medication, past injections, and past physical therapy. Pt reports moderate relief with such treatments provided, however reports a recent flare up/exacerbation of symptoms within the last couple months. Pt was most recently seen at [facility], initation of such treatment ~ one month ago. However, patient has now transitioned to [facility] for continuation of care for above noted lumbosacral condition. Patient motivated and eager to re-initiate PT tx with goal to reduce pain and improve functional mobility needed to allow for optimal participation and performance in household, community ADLs/IADLs, work related job duties, and recreational/social activities.           Past Medical History/Physical Systems Review:   Alisa Millan  has a past medical history of Allergy, Anxiety, Clotting disorder, Depression, GERD (gastroesophageal reflux disease), History of uterine fibroid, Hypertension, and Insulin resistance.    Alisa Millan  has a past surgical history that includes Knee surgery and  section, classic.    Alisa has a current medication list which includes the following prescription(s): alprazolam, aspirin, atorvastatin, cholecalciferol  (vitamin d3), ciprofloxacin hcl, cyanocobalamin (vitamin b-12), estradiol, fexofenadine, fluarix quad [id number] (pf), fluoxetine, furosemide, hydrocodone-acetaminophen, meclizine, ondansetron, sitagliptin phosphate, valsartan-hydrochlorothiazide, and xarelto.    Review of patient's allergies indicates:   Allergen Reactions    Nitrofurantoin monohyd/m-cryst Itching and Swelling    Meperidine      Other reaction(s): Flushing (skin)  Other reaction(s): Difficulty breathing    Meperidine (pf)      Other Reaction(s): Flushing (skin)        Objective          Posture:  Patient presents with postural abnormalities which include:    [x] Forward Head   [] Increased Lumbar Lordosis   [x] Rounded Shoulder   [x] Flat Back Posture   [] Increased Thoracic Kyphosis [] Pes Planus   [] Increased Trunk Sway  [] Valgus knee position   [] Increased Trunk Rotation  [] Varus knee position   [] Increased cervical lordosis [] Other:    Sensation:    Sensation to light touch over UE's is  [x] Intact [] Impaired [] Defer  Sensation to light touch over LE's is  [x] Intact [] Impaired [] Defer      Gait Analysis: The patient ambulated with the following assistive device: straight cane; the patient presents with the following gait abnormalities: unsteady gait, decreased step lengths bilaterally, fwd trunk lean, decreased gait speed, and obvious antalgia/guarding throughout.        Functional Tests  Outcome Norms   Timed Up and Go 22 seconds  <13.5 sec   30 second Sit to Stand N/a Age Male Female   60-64 <14 <12   65-69 <12 <11   70-74 <12 <10   75-79 <11 <10   80-84 <10 <9   85-89 <8 <8   90-93 <7 <4      Five Time Sit to Stand    Greater than 14 sec high risk  12.1-14 sec increased risk  12 sec or less low risk 18  20-29 yrs ? 6.0±1.4 sec.  30-39 yrs ? 6.1±1.4 sec.  40-49 yrs ? 7.6±1.8 sec.  50-59 yrs ? 7.7±2.6 sec.  60-69 yrs ? 8.4±0.0 sec (male), 12.7±1.8 sec (female)  70-79 yrs ? 11.6±3.4 sec (male), 13.0±4.8 sec (female)  80-89 yrs ?  16.7±4.5 sec (male), 17.2±5.5 sec (female)   6 minutes walk N/a 60s: >538f, 572m  70s: >471f, 527m  80s: >417f, 392m        RANGE OF MOTION:   Lumbar  (% of full range) Right  (spine) Left   Pain/Dysfunction with Movement Goal    Lumbar Flexion  60% ---  80%   Lumbar Extension MABEL ---  10%   Lumbar Side Bending 50% 50%  75%   Lumbar Rotation 50% 50%  75%            STRENGTH: (* denotes pain)  L/E MMT Right  (spine) Left Dysfunction with Movement Goal   Modified (90/90) Abdominal Strength   ---     Hip Flexion  4-/5 4-/5  4+/5 B   Hip Extension  3+/5 3+/5  4+/5 B   Hip Abduction  4-/5 3+/5  4+/5 B   Knee Extension 4/5 4/5  5/5 B   Knee Flexion 4/5 4/5  5/5 B   Hip IR 4-/5 4-/5  4+/5 B   Hip ER 4/5 4/5  4+/5 B   Ankle DF 4/5 4/5  5/5 B   Ankle PF 4+/5 4+/5  5/5 B   Ankle Inversion    5/5 B   Ankle Eversion    5/5 B        MUSCLE LENGTH:   Muscle Tested  Right Left  Limitation Goal   Hip Flexors [] Normal  [x] Limited [] Normal  [x] Limited  Normal B   ITB / TFL [] Normal  [] Limited [] Normal  [] Limited  Normal B   Quadriceps [] Normal  [] Limited [] Normal  [] Limited  Normal B   Hamstrings  [] Normal  [] Limited [] Normal  [] Limited  Normal B   Piriformis  [] Normal  [x] Limited [] Normal  [x] Limited  Normal B   Gastrocnemius  [] Normal  [] Limited [] Normal  [] Limited  Normal B   Soleus  [] Normal  [] Limited [] Normal  [] Limited  Normal B          JOINT MOBILITY:   Joint Motion  Right Mobility  (spine) Left Mobility Goal   Thoracic PA  [] Hypo     [] Normal     [] Hyper --- Normal   Costotransverse  [] Hypo     [] Normal     [] Hyper [] Hypo     [] Normal     [] Hyper Normal    Lumbar PA [x] Hypo     [] Normal     [] Hyper --- Normal   Lumbar Unilat. PA [x] Hypo     [] Normal     [] Hyper [] Hypo     [] Normal     [] Hyper Normal   Hip:  [] Hypo     [] Normal     [] Hyper [] Hypo     [] Normal     [] Hyper Normal   SIJ:  [] Hypo     [] Normal     [] Hyper [] Hypo     [] Normal     [] Hyper Normal  "        SPECIAL TESTS:    Right  (spine) Left  Goal   Lumbar Distraction  [] Positive     [] Negative --- Negative B    Lumbar Compression [] Positive     [] Negative --- Negative B    SLUMP [] Positive     [] Negative [x] Positive     [] Negative Negative B    Straight Leg Raise [] Positive     [] Negative [x] Positive     [] Negative Negative B    ASIS Compression [x] Positive     [] Negative --- Negative    ASIS Distraction  [] Positive     [] Negative --- Negative    Posterior Shear [] Positive     [] Negative [] Positive     [] Negative Negative B    KEMAR [x] Positive     [] Negative [] Positive     [] Negative Negative B           SENSATION:  Intact to Light Touch        POSTURE:   Pt presents with postural abnormalities which include:       PALPATION: Increased tenderness to palpation of: LS PSM (bilat), QL (bilat), PSIS (bilat), post hip mm (piriformis, glut max, glut med)        FUNCTIONAL MOVEMENT PATTERNS  Movement Analysis Notes   Bed Mobility  DYSFUNCTIONAL, PAINFUL     Transfers DYSFUNCTIONAL, PAINFUL     Sit to Stand DYSFUNCTIONAL, PAINFUL     Squat DYSFUNCTIONAL, PAINFUL     Single Leg Squat      Step Down      Single leg calf raises   R:   L:        Treatment:     CPT Intervention Performed   Today Duration / Intensity     Mat Exercises        TE SKTC right only  x 10x10"   TE DKTC with ball  x 10"10"    TE HS stretch with strap  x 3x30" ea    NMRE TA with bridge x 2x10   TE Supine clams  x GTB, 2x10    NMRE TA with leg extensions  x 6x6   NMRE 90/90 TA with pull downs x 2x10   NMRE 90/90 TA with heel taps  x 6x6   TE FRANCISCO JAVIER press ups x 10x10"               FLOOR EXERCISES        TE Recumbent bike  x 10 min   TE Ball roll outs  x 10, 5, 5 - 10" holds    NMRE Seated pallof press  x 2x10 ea   TE  Shuttle    3 cords                                    Manual Therapy       MT SI MET  Neural flossing, left LE       PLAN               CPT Codes available for Billing:   (10) minutes of Manual therapy (MT) to " improve pain and ROM.  (30) minutes of Therapeutic Exercise (TE) to develop strength, endurance, range of motion, and flexibility.  (15) minutes of Neuromuscular Re-Education (NMR)  to improve: Balance, Coordination, Kinesthetic, Sense, Proprioception, and Posture.  (00) minutes of Therapeutic Activities (TA) to improve functional performance.  Vasopneumatic Device Therapy () for management of swelling/edema. (30672)  Unattended Electrical Stimulation (ES) for muscle performance or pain modulation.  BFR: Blood flow restriction applied during exercise          Assessment & Plan   Assessment  Alisa presents with a condition of Moderate complexity.   Presentation of Symptoms: Evolving  Will Comorbidities Impact Care: Yes            Prognosis: Good  Prognosis Details: Patient prognosis is Fair, if patient is consistent and compliant with Physical Therapy and home exercise program. Patient will benefit from skilled outpatient Physical Therapy to address the deficits stated above and in the chart below, provide patient/family education, and to maximize patient's level of independence.  Anticipated Barriers for therapy: chronicity of condition   Assessment Details: Alisa is a 70 y.o. female referred to outpatient Physical Therapy with a medical diagnosis of LS disc disorder and SIJ dysfunction. Upon performance of PT evaluation, s/s are consistent with MD findings. Physical limitations include expected limitations in lumbar spine ROM, functional weakness of BLE/core/spinal musculature, and postural abnormalities as outlined throughout evaluation. Neural s/s consisting of radicular complaints of pain, numbness, and/or tingling are present, most significantly noted along posterior aspect of left LE extending to calf. Functionally, patient presents with increased pain and difficulty standing/walking for prolonged durations of time, bending/squatting, twisting, lifting, stair climbing,  and performing various ADLs in  home and community . In my professional opinion, patient will continue to benefit from skilled PT care needed to minimize pain complaints while maximizing functional strength and mobility needed to return to PLOF and allow for optimal participation/performance in household/community ADLs/IADLs, work related jobs duties/physical demands, and recreational/social activities.       Plan  From a physical therapy perspective, the patient would benefit from: Skilled Rehab Services                Visit Frequency: 2 times Per Week for 12 Weeks.       This plan was discussed with Patient.   Discussion participants: Agreed Upon Plan of Care  Plan details: Outpatient Physical Therapy to include any combination of the following interventions: virtual visits, dry needling, modalities, electrical stimulation (IFC, Pre-Mod, Attended with Functional Dry Needling), Cervical/Lumbar Traction, Gait Training, Manual Therapy, Neuromuscular Re-ed, Patient Education, Self Care, Therapeutic Exercise, and Therapeutic Activites           The patient's spiritual, cultural, and educational needs were considered, and the patient is agreeable to the plan of care and goals.           Goals:   Active       LTGs       Pt will report worst pain of 4/10 in order to progress toward max functional ability and improve quality of life                Start:  05/27/25    Expected End:  08/19/25            Patient will demonstrate ability to ambulate mod I with cane x 500 feet, demonstrating normalization of gait mechanics including increased B herb through BLE , increased gait speed, min to no antalgia, and an upright posture observed throughout.        Start:  05/27/25    Expected End:  08/19/25            Patient will improve AROM to stated goals, listed in objective measures above, in order to return to maximal functional potential and improve quality of life.        Start:  05/27/25    Expected End:  08/19/25            Patient will improve strength to at  least 4+/5 grossly,  in order to improve functional independence and quality of life.         Start:  05/27/25    Expected End:  08/19/25            Patient will demonstrate improved function as indicated by a score of greater than or equal to 54 out of 100 on FOTO.                Start:  05/27/25    Expected End:  08/19/25               STGs       Pt will report worst pain of 5/10 in order to progress toward max functional ability and improve quality of life                Start:  05/27/25    Expected End:  07/08/25            Patient will improve AROM to 50% of stated goals, listed in objective measures above, in order to progress towards independence with functional activities.         Start:  05/27/25    Expected End:  07/08/25            Patient will improve strength by 1/2 a grade, in order to progress towards independence with functional activities.                Start:  05/27/25    Expected End:  07/08/25            Patient will demonstrate the ability to ambulate mod I with cane x 250 feet (household ambulation distance) with improved gait mechanics as indicated by improved WB herb through BLE,  inc step lengths, improved upright posture, and increased gait speed.        Start:  05/27/25    Expected End:  07/08/25            Patient will demonstrate improved function as indicated by a score of greater than or equal to 52 out of 100 on FOTO.                Start:  05/27/25    Expected End:  08/19/25                Ash Fajardo PT           Current Participants as of 5/27/2025    Name Type Comments Contact Info    Sarwat Carreno MD Referring Provider  [phone]    Signature pending            Sincerely,      Ash Fajardo PT  [facility]                                                            Dear Ash Fajardo PT,    RE: Ms. Alisa Millan, MRN: [id number]    I certify that I have reviewed the attached plan of care and agree to the details  within.        ___________________________  ___________________________  Provider Printed Name   Provider Signed Name      ___________________________  Date and Time

## 2025-05-29 ENCOUNTER — CLINICAL SUPPORT (OUTPATIENT)
Dept: REHABILITATION | Facility: HOSPITAL | Age: 70
End: 2025-05-29
Payer: MEDICARE

## 2025-05-29 DIAGNOSIS — R29.898 WEAKNESS OF BOTH LOWER EXTREMITIES: ICD-10-CM

## 2025-05-29 DIAGNOSIS — M54.17 L-S RADICULOPATHY: ICD-10-CM

## 2025-05-29 DIAGNOSIS — G89.29 CHRONIC BILATERAL LOW BACK PAIN WITH LEFT-SIDED SCIATICA: Primary | ICD-10-CM

## 2025-05-29 DIAGNOSIS — M54.42 CHRONIC BILATERAL LOW BACK PAIN WITH LEFT-SIDED SCIATICA: Primary | ICD-10-CM

## 2025-05-29 DIAGNOSIS — R26.9 GAIT ABNORMALITY: ICD-10-CM

## 2025-05-29 PROCEDURE — 97110 THERAPEUTIC EXERCISES: CPT

## 2025-05-29 PROCEDURE — 97112 NEUROMUSCULAR REEDUCATION: CPT

## 2025-05-29 NOTE — PROGRESS NOTES
"  Outpatient Rehab    Physical Therapy Visit    Patient Name: Alisa Millan  MRN: [id number]  YOB: 1955  Encounter Date: 5/29/2025    Therapy Diagnosis:   Encounter Diagnoses   Name Primary?    Chronic bilateral low back pain with left-sided sciatica Yes    Weakness of both lower extremities     Gait abnormality     L-S radiculopathy      Physician: Sarwat Carreno MD    Physician Orders: Eval and Treat  Medical Diagnosis: Lumbar disc disorder  SI (sacroiliac) joint dysfunction  Low back pain    Visit # / Visits Authorized:  1 / 10  Insurance Authorization Period: 5/27/2025 to 12/31/2025  Date of Evaluation: 5/27/2025  Plan of Care Certification: 5/27/2025 to 8/19/2025      PT/PTA:     Number of PTA visits since last PT visit:   Time In: 1403   Time Out: 1515  Total Time (in minutes): 72   Total Billable Time (in minutes): 40    FOTO:  Intake Score:  %  Survey Score 2:  %  Survey Score 3:  %    Precautions:       Subjective   The patient reports ongoing lower back pain with occasional pain extending down the left lower extremity. The left lower extremity pain has become less constant compared to previous reports. The current pain level is 5/10. The patient expresses satisfaction with the progression throughout PT rehabilitation and is motivated to continue with the plan of care. The patient has noticed improved functional mobility and is able to walk longer distances around the home. However, there is increased pain and difficulty with bed mobility, including getting in and out of bed and rolling over, with complaints of back pain during these activities..         Objective            Treatment:     Alisa Millan      CPT Intervention Performed   Today Duration / Intensity     Mat Exercises        TE SKTC right only  x 10x10"   TE DKTC with ball  x 10"10"    TE HS stretch with strap  x 3x30" ea    NMRE TA with bridge x 2x10   TE Supine clams  x GTB, 2x10    NMRE TA with leg extensions  x 6x6   NMRE " "90/90 TA with pull downs x 2x10   NMRE 90/90 TA with heel taps  x 6x6   TE FRANCISCO JAVIER press ups x 10x10"               FLOOR EXERCISES        TE Recumbent bike  xx 10 min   TE Ball roll outs  xx 10, 5, 5 - 10" holds    NMRE Seated pallof press  xx 2x10 ea   TE  Shuttle  xx 3 cxords                                    Manual Therapy       MT SI MET  Neural flossing, left LE       PLAN               CPT Codes available for Billing:   (NC) minutes of Manual therapy (MT) to improve pain and ROM.  (30) minutes of Therapeutic Exercise (TE) to develop strength, endurance, range of motion, and flexibility.  (10) minutes of Neuromuscular Re-Education (NMR)  to improve: Balance, Coordination, Kinesthetic, Sense, Proprioception, and Posture.  (00) minutes of Therapeutic Activities (TA) to improve functional performance.  Vasopneumatic Device Therapy () for management of swelling/edema. (26255)  Unattended Electrical Stimulation (ES) for muscle performance or pain modulation.  BFR: Blood flow restriction applied during exercise         Assessment & Plan   Assessment: The patient continues to present with chronic lower back pain with occasional radicular symptoms down the left lower extremity. Neural flossing is ongoing as needed to reduce neural tension and aid in pain relief. Therapeutic exercise and neuromuscular re-education focus on core spinal stabilization, with exercises being advanced to improve L-spine stability and enhance functional performance during transfers, bed mobility, and weight-bearing tolerance throughout household and community activities of daily living (ADLs). The patient tolerates treatment well but requires verbal and tactile cueing to ensure appropriate technique and minimize compensatory strategies. Continued therapy is justified by the patient's ongoing symptoms and the need for further improvement in functional mobility and pain reduction.       The patient will continue to benefit from skilled " outpatient physical therapy in order to address the deficits listed in the problem list on the initial evaluation, provide patient and family education, and maximize the patients level of independence in the home and community environments.     The patient's spiritual, cultural, and educational needs were considered, and the patient is agreeable to the plan of care and goals.           Plan: Continue Plan of Care (POC) and progress per patient tolerance. See treatment section for details on planned progressions next session.    Goals:   Active       LTGs       Pt will report worst pain of 4/10 in order to progress toward max functional ability and improve quality of life                Start:  05/27/25    Expected End:  08/19/25            Patient will demonstrate ability to ambulate mod I with cane x 500 feet, demonstrating normalization of gait mechanics including increased B herb through BLE , increased gait speed, min to no antalgia, and an upright posture observed throughout.        Start:  05/27/25    Expected End:  08/19/25            Patient will improve AROM to stated goals, listed in objective measures above, in order to return to maximal functional potential and improve quality of life.        Start:  05/27/25    Expected End:  08/19/25            Patient will improve strength to at least 4+/5 grossly,  in order to improve functional independence and quality of life.         Start:  05/27/25    Expected End:  08/19/25            Patient will demonstrate improved function as indicated by a score of greater than or equal to 54 out of 100 on FOTO.                Start:  05/27/25    Expected End:  08/19/25               STGs       Pt will report worst pain of 5/10 in order to progress toward max functional ability and improve quality of life                Start:  05/27/25    Expected End:  07/08/25            Patient will improve AROM to 50% of stated goals, listed in objective measures above, in order to  progress towards independence with functional activities.         Start:  05/27/25    Expected End:  07/08/25            Patient will improve strength by 1/2 a grade, in order to progress towards independence with functional activities.                Start:  05/27/25    Expected End:  07/08/25            Patient will demonstrate the ability to ambulate mod I with cane x 250 feet (household ambulation distance) with improved gait mechanics as indicated by improved WB herb through BLE,  inc step lengths, improved upright posture, and increased gait speed.        Start:  05/27/25    Expected End:  07/08/25            Patient will demonstrate improved function as indicated by a score of greater than or equal to 52 out of 100 on FOTO.                Start:  05/27/25    Expected End:  08/19/25                Ash Fajardo, PT

## 2025-06-05 ENCOUNTER — CLINICAL SUPPORT (OUTPATIENT)
Dept: REHABILITATION | Facility: HOSPITAL | Age: 70
End: 2025-06-05
Payer: MEDICARE

## 2025-06-05 DIAGNOSIS — M54.42 CHRONIC BILATERAL LOW BACK PAIN WITH LEFT-SIDED SCIATICA: Primary | ICD-10-CM

## 2025-06-05 DIAGNOSIS — R26.9 GAIT ABNORMALITY: ICD-10-CM

## 2025-06-05 DIAGNOSIS — M54.17 L-S RADICULOPATHY: ICD-10-CM

## 2025-06-05 DIAGNOSIS — G89.29 CHRONIC BILATERAL LOW BACK PAIN WITH LEFT-SIDED SCIATICA: Primary | ICD-10-CM

## 2025-06-05 DIAGNOSIS — R29.898 WEAKNESS OF BOTH LOWER EXTREMITIES: ICD-10-CM

## 2025-06-05 PROCEDURE — 97112 NEUROMUSCULAR REEDUCATION: CPT

## 2025-06-05 PROCEDURE — 97140 MANUAL THERAPY 1/> REGIONS: CPT

## 2025-06-05 PROCEDURE — 97110 THERAPEUTIC EXERCISES: CPT

## 2025-06-12 ENCOUNTER — CLINICAL SUPPORT (OUTPATIENT)
Dept: REHABILITATION | Facility: HOSPITAL | Age: 70
End: 2025-06-12
Payer: MEDICARE

## 2025-06-12 DIAGNOSIS — R29.898 WEAKNESS OF BOTH LOWER EXTREMITIES: ICD-10-CM

## 2025-06-12 DIAGNOSIS — R26.9 GAIT ABNORMALITY: ICD-10-CM

## 2025-06-12 DIAGNOSIS — M54.17 L-S RADICULOPATHY: ICD-10-CM

## 2025-06-12 DIAGNOSIS — M54.42 CHRONIC BILATERAL LOW BACK PAIN WITH LEFT-SIDED SCIATICA: Primary | ICD-10-CM

## 2025-06-12 DIAGNOSIS — G89.29 CHRONIC BILATERAL LOW BACK PAIN WITH LEFT-SIDED SCIATICA: Primary | ICD-10-CM

## 2025-06-12 PROCEDURE — 97110 THERAPEUTIC EXERCISES: CPT

## 2025-06-12 NOTE — PROGRESS NOTES
"  Outpatient Rehab    Physical Therapy Visit    Patient Name: Alisa Millan  MRN: [id number]  YOB: 1955  Encounter Date: 6/12/2025    Therapy Diagnosis:   Encounter Diagnoses   Name Primary?    Chronic bilateral low back pain with left-sided sciatica Yes    Weakness of both lower extremities     Gait abnormality     L-S radiculopathy      Physician: Sarwat Carreno MD    Physician Orders: Eval and Treat  Medical Diagnosis: Lumbar disc disorder  SI (sacroiliac) joint dysfunction  Low back pain  Surgical Diagnosis: Not applicable for this Episode   Surgical Date: Not applicable for this Episode    Visit # / Visits Authorized:  3 / 10  Insurance Authorization Period: 5/27/2025 to 12/31/2025  Date of Evaluation: 5/27/2025  Plan of Care Certification: 5/27/2025 to 8/19/2025      PT/PTA:     Number of PTA visits since last PT visit:   Time In: 1500   Time Out: 1515  Total Time (in minutes): 15   Total Billable Time (in minutes): 15    FOTO:  Intake Score:  %  Survey Score 2:  %  Survey Score 3:  %    Precautions:       Subjective   Pt continues to report lower back pain, however gradually getting better with therapy. However, she states she has been fighting vertigo symptoms (dizziness and nausea) all day and almost cx her appointment. however, she states she wanted to push through in hopes that she can tolerate the majority of tx session..  Pain reported as 6/10.      Objective            Treatment:     Alisa Millan      CPT Intervention Performed   Today Duration / Intensity     Mat Exercises        TE DKTC with ball    10"10"   TE LTR   2x10   TE HS stretch with strap    3x30" ea   NMRE TA with bridge   2x10, GTB iso   TE Supine clams    GTB, 2x10    NMRE TA with leg extensions    6x6   NMRE 90/90 TA with pull downs   2x10   NMRE 90/90 TA with heel taps    6x6   TE FRANCISCO JAVIER press ups   10x10"               FLOOR EXERCISES        TE Recumbent bike  xx 10 min   TE Ball roll outs  xx 10, 5, 5 - 10" holds  "   NMRE Seated pallof press    2x10 ea   TE  Shuttle    3 cxords                                    Manual Therapy       MT SI MET  Neural flossing, left LE       PLAN               CPT Codes available for Billing:   (0) minutes of Manual therapy (MT) to improve pain and ROM.  (15) minutes of Therapeutic Exercise (TE) to develop strength, endurance, range of motion, and flexibility.  (00) minutes of Neuromuscular Re-Education (NMR)  to improve: Balance, Coordination, Kinesthetic, Sense, Proprioception, and Posture.  (00) minutes of Therapeutic Activities (TA) to improve functional performance.  Vasopneumatic Device Therapy () for management of swelling/edema. (23856)  Unattended Electrical Stimulation (ES) for muscle performance or pain modulation.  BFR: Blood flow restriction applied during exercise          Assessment & Plan   Assessment: Patient continues to c.o lower back pain, however has improved since onset ot therapy. Tx extremely limited today 2/2 vertigo symptoms (dizziness and nausea). She requests to cease treatment after only participating in the bike and light mat exercises, stating she does not feel physically able to cont with tx session. plan to cont with normal tx regimen next visit pending resolution of vertogo symptoms.       The patient will continue to benefit from skilled outpatient physical therapy in order to address the deficits listed in the problem list on the initial evaluation, provide patient and family education, and maximize the patients level of independence in the home and community environments.     The patient's spiritual, cultural, and educational needs were considered, and the patient is agreeable to the plan of care and goals.           Plan: Continue Plan of Care (POC) and progress per patient tolerance. See treatment section for details on planned progressions next session.    Goals:   Active       LTGs       Pt will report worst pain of 4/10 in order to progress toward  max functional ability and improve quality of life                Start:  05/27/25    Expected End:  08/19/25            Patient will demonstrate ability to ambulate mod I with cane x 500 feet, demonstrating normalization of gait mechanics including increased B herb through BLE , increased gait speed, min to no antalgia, and an upright posture observed throughout.        Start:  05/27/25    Expected End:  08/19/25            Patient will improve AROM to stated goals, listed in objective measures above, in order to return to maximal functional potential and improve quality of life.        Start:  05/27/25    Expected End:  08/19/25            Patient will improve strength to at least 4+/5 grossly,  in order to improve functional independence and quality of life.         Start:  05/27/25    Expected End:  08/19/25            Patient will demonstrate improved function as indicated by a score of greater than or equal to 54 out of 100 on FOTO.                Start:  05/27/25    Expected End:  08/19/25               STGs       Pt will report worst pain of 5/10 in order to progress toward max functional ability and improve quality of life                Start:  05/27/25    Expected End:  07/08/25            Patient will improve AROM to 50% of stated goals, listed in objective measures above, in order to progress towards independence with functional activities.         Start:  05/27/25    Expected End:  07/08/25            Patient will improve strength by 1/2 a grade, in order to progress towards independence with functional activities.                Start:  05/27/25    Expected End:  07/08/25            Patient will demonstrate the ability to ambulate mod I with cane x 250 feet (household ambulation distance) with improved gait mechanics as indicated by improved WB herb through BLE,  inc step lengths, improved upright posture, and increased gait speed.        Start:  05/27/25    Expected End:  07/08/25            Patient  will demonstrate improved function as indicated by a score of greater than or equal to 52 out of 100 on FOTO.                Start:  05/27/25    Expected End:  08/19/25                Ash Fajardo PT

## 2025-06-23 ENCOUNTER — CLINICAL SUPPORT (OUTPATIENT)
Dept: REHABILITATION | Facility: HOSPITAL | Age: 70
End: 2025-06-23
Payer: MEDICARE

## 2025-06-23 DIAGNOSIS — M54.42 CHRONIC BILATERAL LOW BACK PAIN WITH LEFT-SIDED SCIATICA: Primary | ICD-10-CM

## 2025-06-23 DIAGNOSIS — R29.898 WEAKNESS OF BOTH LOWER EXTREMITIES: ICD-10-CM

## 2025-06-23 DIAGNOSIS — M54.17 L-S RADICULOPATHY: ICD-10-CM

## 2025-06-23 DIAGNOSIS — R26.9 GAIT ABNORMALITY: ICD-10-CM

## 2025-06-23 DIAGNOSIS — G89.29 CHRONIC BILATERAL LOW BACK PAIN WITH LEFT-SIDED SCIATICA: Primary | ICD-10-CM

## 2025-06-23 PROCEDURE — 97530 THERAPEUTIC ACTIVITIES: CPT

## 2025-06-23 PROCEDURE — 97110 THERAPEUTIC EXERCISES: CPT

## 2025-06-24 NOTE — PROGRESS NOTES
"  Outpatient Rehab    Physical Therapy Visit    Patient Name: Alisa Millan  MRN: [id number]  YOB: 1955  Encounter Date: 6/23/2025    Therapy Diagnosis:   Encounter Diagnoses   Name Primary?    Chronic bilateral low back pain with left-sided sciatica Yes    Weakness of both lower extremities     Gait abnormality     L-S radiculopathy      Physician: Sarwat Carreno MD    Physician Orders: Eval and Treat  Medical Diagnosis: Lumbar disc disorder  SI (sacroiliac) joint dysfunction  Low back pain  Surgical Diagnosis: Not applicable for this Episode   Surgical Date: Not applicable for this Episode  Days Since Last Surgery: Not applicable for this Episode    Visit # / Visits Authorized:  4 / 10  Insurance Authorization Period: 5/27/2025 to 12/31/2025  Date of Evaluation: 5/27/2025  Plan of Care Certification: 5/27/2025 to 8/19/2025      PT/PTA:     Number of PTA visits since last PT visit:   Time In: 1330   Time Out: 1435  Total Time (in minutes): 65   Total Billable Time (in minutes): 55    FOTO:  Intake Score:  %  Survey Score 2:  %  Survey Score 3:  %    Precautions:       Subjective The patient presents to the PT clinic with continuing complaints of lower back pain. She reports being unable to attend physical therapy for the last one to two weeks due to severe vertigo and vestibular symptoms. The patient requests to perform all exercises in a sitting or standing position today, fearing that lying down might trigger vertigo symptoms. She expresses eagerness to resume therapy to address her lower back pain and improve overall functional strength and mobility.            Objective            Treatment:     Alisa Millan       CPT Intervention Performed   Today Duration / Intensity     Mat Exercises        TE DKTC with ball    10"10"   TE LTR   2x10   TE HS stretch with strap    3x30" ea   NMRE TA with bridge   2x10, GTB iso   TE Supine clams    GTB, 2x10   NMRE TA with leg extensions    6x6   NMRE 90/90 " "TA with pull downs   2x10   NMRE 90/90 TA with heel taps    6x6   TE FRANCISCO JAVIER press ups   10x10"               FLOOR EXERCISES        TE Recumbent bike  xx 15 min   TE Ball roll outs  xx 10, 5, 5 - 10" holds    TA Pallof Press - standing with PB  xx X12 ea, red PB   TE  Leg press xx 35#, 3x10   TA Standing hip abduction xx GTB - 2x10   TA Standing hip extension xx GTB - 2x10   TA Lateral walks // bars xx GTB at ankles - 5 laps    TE Lumbar extension stretch // bars xx 10x10"     Manual Therapy       MT SI MET  Neural flossing, left LE       PLAN               CPT Codes available for Billing:   (0) minutes of Manual therapy (MT) to improve pain and ROM.  (25) minutes of Therapeutic Exercise (TE) to develop strength, endurance, range of motion, and flexibility.  (00) minutes of Neuromuscular Re-Education (NMR)  to improve: Balance, Coordination, Kinesthetic, Sense, Proprioception, and Posture.  (28) minutes of Therapeutic Activities (TA) to improve functional performance.  Vasopneumatic Device Therapy () for management of swelling/edema. (47022)  Unattended Electrical Stimulation (ES) for muscle performance or pain modulation.  BFR: Blood flow restriction applied during exercise          Assessment & Plan   Assessment:  The patient returns to PT after approximately two weeks absence due to severe vertigo, with continuing lower back pain and vestibular symptoms affecting treatment approach. Treatment was significantly modified to accommodate the patient's request to avoid lying down, focusing on standing and sitting exercises to prevent triggering vertigo symptoms. The treatment aimed at addressing core, spine, and lower extremity musculature strength as well as to improve lumbar spine mobility. The patient tolerated the modified treatment well, reporting mod pain (5-6/10) to the lower back towards the end of today's session. The plan is to continue with modified treatment until it is deemed safe to return to mat " exercises. VC/TC required per PT to assure proper form/technique and posture, while minimizing compensatory strategies throughout exercise performance. Justification for continued therapy includes the need to address lower back pain and improve functional strength and mobility needed to allow patient to participate in ADLs/IADLs with less restrictions.        The patient will continue to benefit from skilled outpatient physical therapy in order to address the deficits listed in the problem list on the initial evaluation, provide patient and family education, and maximize the patients level of independence in the home and community environments.     The patient's spiritual, cultural, and educational needs were considered, and the patient is agreeable to the plan of care and goals.           Plan: Continue Plan of Care (POC) and progress per patient tolerance. See treatment section for details on planned progressions next session.    Goals:   Active       LTGs       Pt will report worst pain of 4/10 in order to progress toward max functional ability and improve quality of life                Start:  05/27/25    Expected End:  08/19/25            Patient will demonstrate ability to ambulate mod I with cane x 500 feet, demonstrating normalization of gait mechanics including increased B herb through BLE , increased gait speed, min to no antalgia, and an upright posture observed throughout.        Start:  05/27/25    Expected End:  08/19/25            Patient will improve AROM to stated goals, listed in objective measures above, in order to return to maximal functional potential and improve quality of life.        Start:  05/27/25    Expected End:  08/19/25            Patient will improve strength to at least 4+/5 grossly,  in order to improve functional independence and quality of life.         Start:  05/27/25    Expected End:  08/19/25            Patient will demonstrate improved function as indicated by a score of  greater than or equal to 54 out of 100 on FOTO.                Start:  05/27/25    Expected End:  08/19/25               STGs       Pt will report worst pain of 5/10 in order to progress toward max functional ability and improve quality of life                Start:  05/27/25    Expected End:  07/08/25            Patient will improve AROM to 50% of stated goals, listed in objective measures above, in order to progress towards independence with functional activities.         Start:  05/27/25    Expected End:  07/08/25            Patient will improve strength by 1/2 a grade, in order to progress towards independence with functional activities.                Start:  05/27/25    Expected End:  07/08/25            Patient will demonstrate the ability to ambulate mod I with cane x 250 feet (household ambulation distance) with improved gait mechanics as indicated by improved WB herb through BLE,  inc step lengths, improved upright posture, and increased gait speed.        Start:  05/27/25    Expected End:  07/08/25            Patient will demonstrate improved function as indicated by a score of greater than or equal to 52 out of 100 on FOTO.                Start:  05/27/25    Expected End:  08/19/25                Ash Fajardo, PT

## 2025-06-25 ENCOUNTER — CLINICAL SUPPORT (OUTPATIENT)
Dept: REHABILITATION | Facility: HOSPITAL | Age: 70
End: 2025-06-25
Payer: MEDICARE

## 2025-06-25 DIAGNOSIS — G89.29 CHRONIC BILATERAL LOW BACK PAIN WITH LEFT-SIDED SCIATICA: Primary | ICD-10-CM

## 2025-06-25 DIAGNOSIS — M54.42 CHRONIC BILATERAL LOW BACK PAIN WITH LEFT-SIDED SCIATICA: Primary | ICD-10-CM

## 2025-06-25 DIAGNOSIS — M54.17 L-S RADICULOPATHY: ICD-10-CM

## 2025-06-25 DIAGNOSIS — R29.898 WEAKNESS OF BOTH LOWER EXTREMITIES: ICD-10-CM

## 2025-06-25 DIAGNOSIS — R26.9 GAIT ABNORMALITY: ICD-10-CM

## 2025-06-25 PROCEDURE — 97530 THERAPEUTIC ACTIVITIES: CPT

## 2025-06-25 PROCEDURE — 97110 THERAPEUTIC EXERCISES: CPT

## 2025-06-25 NOTE — PROGRESS NOTES
"  Outpatient Rehab    Physical Therapy Progress Note    Patient Name: Alisa Millan  MRN: [id number]  YOB: 1955  Encounter Date: 6/25/2025    Therapy Diagnosis:   Encounter Diagnoses   Name Primary?    Chronic bilateral low back pain with left-sided sciatica Yes    Weakness of both lower extremities     Gait abnormality     L-S radiculopathy      Physician: Sarwat Carreno MD    Physician Orders: Eval and Treat  Medical Diagnosis: Lumbar disc disorder  SI (sacroiliac) joint dysfunction  Low back pain  Surgical Diagnosis: Not applicable for this Episode   Surgical Date: Not applicable for this Episode  Days Since Last Surgery: Not applicable for this Episode    Visit # / Visits Authorized:  5 / 10  Insurance Authorization Period: 5/27/2025 to 12/31/2025  Date of Evaluation: 5/27/2025  Plan of Care Certification: 5/27/2025 to 8/19/2025      PT/PTA:     Number of PTA visits since last PT visit:   Time In: 1315   Time Out: 1425  Total Time (in minutes): 70   Total Billable Time (in minutes): 62    FOTO:  Intake Score: 47%  Survey Score 2: 47%  Survey Score 3:  %    Precautions:       Subjective   Pt states she has continued to have vestibular/vertigo symptoms and would like to continue avoiding any tx that requires her to lie on mat as such supine position typically elicits such vestibular symptoms. She states she was sore after last tx session, however states it was a "good sore" and is motivated to cont with functional strength training of BLE, core, spinal mm. Overall, she feels that PT tx is helping to reduce lower back pain complaints as well as improving functional strength of BLE/core/spinal mm. Functionally, she states she is able to do more around the house with less pain or restrictions, however still limited in longer distance/community ambulation and higher level tasks such as stair climbing, deep squatting, and more strenous tasks involving lifting, pushing, pulling. States she would like to " cont with therapy needed to further reduce lower back pain and occassional radicular complaints (down BLE) while maximizing functional strength and endurance needed for optimal participation in ADLs..  Pain reported as 6/10.      Objective            PALPATION: Increased tenderness to palpation of: LS PSM (bilat), QL (bilat), PSIS (bilat), post hip mm (piriformis, glut max, glut med) - however mild in comparison to IE.     Posture:  Patient presents with postural abnormalities which include:               [x] Forward Head                                [] Increased Lumbar Lordosis              [x] Rounded Shoulder                         [x] Flat Back Posture              [] Increased Thoracic Kyphosis        [] Pes Planus              [] Increased Trunk Sway                   [] Valgus knee position              [] Increased Trunk Rotation              [] Varus knee position              [] Increased cervical lordosis            [] Other:     Sensation:    Sensation to light touch over UE's is  [x] Intact [] Impaired [] Defer  Sensation to light touch over LE's is  [x] Intact [] Impaired [] Defer        Gait Analysis: The patient ambulated with the following assistive device: straight cane; the patient presents with the following gait abnormalities: unsteady gait, decreased step lengths bilaterally, fwd trunk lean, decreased gait speed, and obvious antalgia/guarding throughout.          Functional Tests  Outcome Norms   Timed Up and Go 13.52 seconds  <13.5 sec   30 second Sit to Stand 8 Age Male Female   60-64 <14 <12   65-69 <12 <11   70-74 <12 <10   75-79 <11 <10   80-84 <10 <9   85-89 <8 <8   90-93 <7 <4      Five Time Sit to Stand     Greater than 14 sec high risk  12.1-14 sec increased risk  12 sec or less low risk 17 20-29 yrs ? 6.0±1.4 sec.  30-39 yrs ? 6.1±1.4 sec.  40-49 yrs ? 7.6±1.8 sec.  50-59 yrs ? 7.7±2.6 sec.  60-69 yrs ? 8.4±0.0 sec (male), 12.7±1.8 sec (female)  70-79 yrs ? 11.6±3.4 sec (male),  13.0±4.8 sec (female)  80-89 yrs ? 16.7±4.5 sec (male), 17.2±5.5 sec (female)   6 minutes walk N/a 60s: >538f, 572m  70s: >471f, 527m  80s: >417f, 392m         RANGE OF MOTION:   Lumbar  (% of full range) Right  (spine) Left    Pain/Dysfunction with Movement Goal    Lumbar Flexion  70% ---   80%   Lumbar Extension MABEL ---   10%   Lumbar Side Bending 75% 60%   75%   Lumbar Rotation 70% 80%   75%             STRENGTH: (* denotes pain)  L/E MMT Right  (spine) Left Dysfunction with Movement Goal   Modified (90/90) Abdominal Strength    ---       Hip Flexion  4/5 4/5   4+/5 B   Hip Extension  4-/5 4-/5   4+/5 B   Hip Abduction  4/5 4-/5   4+/5 B   Knee Extension 4+/5 4/5   5/5 B   Knee Flexion 4+/5 4/5   5/5 B   Hip IR 4/5 4/5   4+/5 B   Hip ER 4/5 4/5   4+/5 B   Ankle DF 4+/5 4+/5   5/5 B   Ankle PF 4+/5 4+/5   5/5 B   Ankle Inversion       5/5 B   Ankle Eversion       5/5 B         MUSCLE LENGTH:   Muscle Tested  Right Left  Limitation Goal   Hip Flexors [] Normal  [x] Limited [] Normal  [x] Limited   Normal B   ITB / TFL [] Normal  [] Limited [] Normal  [] Limited   Normal B   Quadriceps [] Normal  [] Limited [] Normal  [] Limited   Normal B   Hamstrings  [] Normal  [] Limited [] Normal  [] Limited   Normal B   Piriformis  [] Normal  [x] Limited [] Normal  [x] Limited   Normal B   Gastrocnemius  [] Normal  [] Limited [] Normal  [] Limited   Normal B   Soleus  [] Normal  [] Limited [] Normal  [] Limited   Normal B            JOINT MOBILITY:   Joint Motion  Right Mobility  (spine) Left Mobility Goal   Thoracic PA  [] Hypo     [] Normal     [] Hyper --- Normal   Costotransverse  [] Hypo     [] Normal     [] Hyper [] Hypo     [] Normal     [] Hyper Normal    Lumbar PA [x] Hypo     [] Normal     [] Hyper --- Normal   Lumbar Unilat. PA [x] Hypo     [] Normal     [] Hyper [] Hypo     [] Normal     [] Hyper Normal   Hip:  [] Hypo     [] Normal     [] Hyper [] Hypo     [] Normal     [] Hyper Normal   SIJ:  [] Hypo     []  "Normal     [] Hyper [] Hypo     [] Normal     [] Hyper Normal            SPECIAL TESTS:     Right  (spine) Left  Goal   Lumbar Distraction  [] Positive     [] Negative --- Negative B    Lumbar Compression [] Positive     [] Negative --- Negative B    SLUMP [] Positive     [] Negative [x] Positive     [] Negative Negative B    Straight Leg Raise [] Positive     [] Negative [x] Positive     [] Negative Negative B    ASIS Compression [x] Positive     [] Negative --- Negative    ASIS Distraction  [] Positive     [] Negative --- Negative    Posterior Shear [] Positive     [] Negative [] Positive     [] Negative Negative B    KEMAR [] Positive     [] Negative [] Positive     [] Negative Negative B            Treatment:     Alisa Millan       CPT Intervention Performed   Today Duration / Intensity     Mat Exercises        TE DKTC with ball    10"10"   TE LTR   2x10   TE HS stretch with strap    3x30" ea   NMRE TA with bridge   2x10, GTB iso   TE Supine clams    GTB, 2x10   NMRE TA with leg extensions    6x6   NMRE 90/90 TA with pull downs   2x10   NMRE 90/90 TA with heel taps    6x6   TE FRANCISCO JAVIER press ups   10x10"               FLOOR EXERCISES        TE Recumbent bike  xx 15 min   TE Ball roll outs  xx 10, 5, 5 - 10" holds    TA Pallof Press - standing with PB  xx X12 ea, red PB   TE  Leg press xx 45#, 3x10   TA Standing hip abduction xx YTB - 2x10   TA Standing hip extension xx YTB - 2x10   TA Lateral walks // bars xx GTB at ankles - 5 laps    TE Lumbar extension stretch // bars xx 10x10"   TE OBJECTIVE TEST/MEASURES REASEESSMENTS xx       Manual Therapy       MT SI MET  Neural flossing, left LE       PLAN               CPT Codes available for Billing:   (0) minutes of Manual therapy (MT) to improve pain and ROM.  (25) minutes of Therapeutic Exercise (TE) to develop strength, endurance, range of motion, and flexibility.  (00) minutes of Neuromuscular Re-Education (NMR)  to improve: Balance, Coordination, Kinesthetic, " Sense, Proprioception, and Posture.  (28) minutes of Therapeutic Activities (TA) to improve functional performance.  Vasopneumatic Device Therapy () for management of swelling/edema. (19312)  Unattended Electrical Stimulation (ES) for muscle performance or pain modulation.  BFR: Blood flow restriction applied during exercise          Assessment & Plan   Assessment: Overall, patient progressing well throughout PT rehab, despite currently being most limited by vestibular/vertigo symptoms limiting her ability to participate in all therapeutic exercise. Subjectively, patient reports mild reduction of lower back and BLE pain complaints. States radicular complaints can be on either side, however frequency of such radicular pain complaints has been decreased since onset of therapy. Objectively, PT notes mild improvement in BLE strength and functional strength/endurance and gait mechanics as observed via MMT and functional testing scores above. Overall, pt is progressing well towards LTGs, however not yet achieved and will cont to benefit from skilled care needed to further progress towards achievement of such goals.        The patient will continue to benefit from skilled outpatient physical therapy in order to address the deficits listed in the problem list on the initial evaluation, provide patient and family education, and maximize the patients level of independence in the home and community environments.     The patient's spiritual, cultural, and educational needs were considered, and the patient is agreeable to the plan of care and goals.           Plan: Continue Plan of Care (POC) and progress per patient tolerance. See treatment section for details on planned progressions next session.    Goals:   Active       LTGs       Pt will report worst pain of 4/10 in order to progress toward max functional ability and improve quality of life          (Progressing)       Start:  05/27/25    Expected End:  08/19/25             Patient will demonstrate ability to ambulate mod I with cane x 500 feet, demonstrating normalization of gait mechanics including increased B herb through BLE , increased gait speed, min to no antalgia, and an upright posture observed throughout.  (Progressing)       Start:  05/27/25    Expected End:  08/19/25            Patient will improve AROM to stated goals, listed in objective measures above, in order to return to maximal functional potential and improve quality of life.  (Progressing)       Start:  05/27/25    Expected End:  08/19/25            Patient will improve strength to at least 4+/5 grossly,  in order to improve functional independence and quality of life.   (Progressing)       Start:  05/27/25    Expected End:  08/19/25            Patient will demonstrate improved function as indicated by a score of greater than or equal to 54 out of 100 on FOTO.          (Not Progressing)       Start:  05/27/25    Expected End:  08/19/25               STGs       Pt will report worst pain of 5/10 in order to progress toward max functional ability and improve quality of life          (Progressing)       Start:  05/27/25    Expected End:  07/08/25            Patient will improve AROM to 50% of stated goals, listed in objective measures above, in order to progress towards independence with functional activities.   (Progressing)       Start:  05/27/25    Expected End:  07/08/25            Patient will improve strength by 1/2 a grade, in order to progress towards independence with functional activities.          (Met)       Start:  05/27/25    Expected End:  07/08/25    Resolved:  06/25/25         Patient will demonstrate the ability to ambulate mod I with cane x 250 feet (household ambulation distance) with improved gait mechanics as indicated by improved WB herb through BLE,  inc step lengths, improved upright posture, and increased gait speed.  (Met)       Start:  05/27/25    Expected End:  07/08/25    Resolved:   06/25/25         Patient will demonstrate improved function as indicated by a score of greater than or equal to 52 out of 100 on FOTO.          (Progressing)       Start:  05/27/25    Expected End:  08/19/25                Ash Fajardo PT

## 2025-07-09 ENCOUNTER — CLINICAL SUPPORT (OUTPATIENT)
Dept: REHABILITATION | Facility: HOSPITAL | Age: 70
End: 2025-07-09
Payer: MEDICARE

## 2025-07-09 DIAGNOSIS — R29.898 WEAKNESS OF BOTH LOWER EXTREMITIES: ICD-10-CM

## 2025-07-09 DIAGNOSIS — M54.42 CHRONIC BILATERAL LOW BACK PAIN WITH LEFT-SIDED SCIATICA: Primary | ICD-10-CM

## 2025-07-09 DIAGNOSIS — G89.29 CHRONIC BILATERAL LOW BACK PAIN WITH LEFT-SIDED SCIATICA: Primary | ICD-10-CM

## 2025-07-09 DIAGNOSIS — M54.17 L-S RADICULOPATHY: ICD-10-CM

## 2025-07-09 DIAGNOSIS — R26.9 GAIT ABNORMALITY: ICD-10-CM

## 2025-07-09 PROCEDURE — 97112 NEUROMUSCULAR REEDUCATION: CPT

## 2025-07-09 PROCEDURE — 97530 THERAPEUTIC ACTIVITIES: CPT

## 2025-07-09 PROCEDURE — 97110 THERAPEUTIC EXERCISES: CPT

## 2025-07-10 NOTE — PROGRESS NOTES
"  Outpatient Rehab    Physical Therapy Visit    Patient Name: Alisa Millan  MRN: [id number]  YOB: 1955  Encounter Date: 7/9/2025    Therapy Diagnosis:   Encounter Diagnoses   Name Primary?    Chronic bilateral low back pain with left-sided sciatica Yes    Weakness of both lower extremities     Gait abnormality     L-S radiculopathy      Physician: Sarwat Carreno MD    Physician Orders: Eval and Treat  Medical Diagnosis: Lumbar disc disorder  SI (sacroiliac) joint dysfunction  Low back pain  Surgical Diagnosis: Not applicable for this Episode   Surgical Date: Not applicable for this Episode  Days Since Last Surgery: Not applicable for this Episode    Visit # / Visits Authorized:  6 / 10  Insurance Authorization Period: 5/27/2025 to 12/31/2025  Date of Evaluation: 5/27/2025  Plan of Care Certification: 5/27/2025 to 8/19/2025      PT/PTA:     Number of PTA visits since last PT visit:   Time In: 1330   Time Out: 1435  Total Time (in minutes): 65   Total Billable Time (in minutes): 60    FOTO:  Intake Score:  %  Survey Score 2:  %  Survey Score 3:  %    Precautions:       Subjective   Patient states she cont to have lower back pain, however not as intense as it once was. She states she is able to walk and perform WB activity with less difficulty. Still reports feeling dizzy from vertigo and still does not feel comfortable performing supine exercises. request to put head of table at incline throughout mat exercises..  Pain reported as 5/10. Low back    Objective            Treatment:        CPT Intervention Performed   Today Duration / Intensity     Mat Exercises        TE DKTC with ball  x 10"10"   TE LTR x 2x10   TE HS stretch with strap  x 3x30" ea   NMRE TA with bridge x 2x10, GTB iso   TE SL clams  x GTB, 2x10   NMRE TA with leg extensions  x 6x6   NMRE 90/90 TA with pull downs x 2x10   NMRE 90/90 TA with heel taps    6x6   TE FRANCISCO JAVIER press ups   10x10"               FLOOR EXERCISES        TE Recumbent " "bike  x 10 min   TE Ball roll outs  x 10, 5, 5 - 10" holds    TA Pallof Press - standing with PB  x X12 ea, red PB   TE  Leg press x 45#, 3x10   TA Standing hip abduction x YTB - 2x10   TA Standing hip extension x YTB - 2x10   TA Lateral walks // bars x GTB at ankles - 5 laps    TE Lumbar extension stretch // bars x 10x10"   TE OBJECTIVE TEST/MEASURES REASEESSMENTS         Manual Therapy       MT SI MET  Neural flossing, left LE       PLAN               CPT Codes available for Billing:   (0) minutes of Manual therapy (MT) to improve pain and ROM.  (20) minutes of Therapeutic Exercise (TE) to develop strength, endurance, range of motion, and flexibility.  (10) minutes of Neuromuscular Re-Education (NMR)  to improve: Balance, Coordination, Kinesthetic, Sense, Proprioception, and Posture.  (28) minutes of Therapeutic Activities (TA) to improve functional performance.  Vasopneumatic Device Therapy () for management of swelling/edema. (18490)  Unattended Electrical Stimulation (ES) for muscle performance or pain modulation.  BFR: Blood flow restriction applied during exercise          Assessment & Plan   Assessment: Overall, patient progressing well throughout PT rehab, despite currently being most limited by vestibular/vertigo symptoms. However, patient able to perform all mat exercises with head of bed elevated to avoid reproduction of vestibular symptoms such as dizziness/light headedness.  Subjectively, patient reports mild reduction of lower back and BLE pain complaints. States radicular complaints can be on either side, however frequency of such radicular pain complaints has been decreased since onset of therapy. Objectively, PT notes mild improvement in BLE strength and functional strength/endurance and gait mechanics. PT tx today with continued emphasis on functional strengthening of BLE, core, spinal musculature with VC, TC required throughout to facilitate proper body mechanics during squats, sit<>stands, and " various functional movement patterns. Tolerates tx well. Cont per PT POC.        The patient will continue to benefit from skilled outpatient physical therapy in order to address the deficits listed in the problem list on the initial evaluation, provide patient and family education, and maximize the patients level of independence in the home and community environments.     The patient's spiritual, cultural, and educational needs were considered, and the patient is agreeable to the plan of care and goals.          Plan: Continue Plan of Care (POC) and progress per patient tolerance. See treatment section for details on planned progressions next session.    Goals:   Active       LTGs       Pt will report worst pain of 4/10 in order to progress toward max functional ability and improve quality of life          (Progressing)       Start:  05/27/25    Expected End:  08/19/25            Patient will demonstrate ability to ambulate mod I with cane x 500 feet, demonstrating normalization of gait mechanics including increased B herb through BLE , increased gait speed, min to no antalgia, and an upright posture observed throughout.  (Progressing)       Start:  05/27/25    Expected End:  08/19/25            Patient will improve AROM to stated goals, listed in objective measures above, in order to return to maximal functional potential and improve quality of life.  (Progressing)       Start:  05/27/25    Expected End:  08/19/25            Patient will improve strength to at least 4+/5 grossly,  in order to improve functional independence and quality of life.   (Progressing)       Start:  05/27/25    Expected End:  08/19/25            Patient will demonstrate improved function as indicated by a score of greater than or equal to 54 out of 100 on FOTO.          (Not Progressing)       Start:  05/27/25    Expected End:  08/19/25               STGs       Pt will report worst pain of 5/10 in order to progress toward max functional  ability and improve quality of life          (Progressing)       Start:  05/27/25    Expected End:  07/08/25            Patient will improve AROM to 50% of stated goals, listed in objective measures above, in order to progress towards independence with functional activities.   (Progressing)       Start:  05/27/25    Expected End:  07/08/25            Patient will improve strength by 1/2 a grade, in order to progress towards independence with functional activities.          (Met)       Start:  05/27/25    Expected End:  07/08/25    Resolved:  06/25/25         Patient will demonstrate the ability to ambulate mod I with cane x 250 feet (household ambulation distance) with improved gait mechanics as indicated by improved WB herb through BLE,  inc step lengths, improved upright posture, and increased gait speed.  (Met)       Start:  05/27/25    Expected End:  07/08/25    Resolved:  06/25/25         Patient will demonstrate improved function as indicated by a score of greater than or equal to 52 out of 100 on FOTO.          (Progressing)       Start:  05/27/25    Expected End:  08/19/25                Ash Fajardo, PT

## 2025-07-14 ENCOUNTER — CLINICAL SUPPORT (OUTPATIENT)
Dept: REHABILITATION | Facility: HOSPITAL | Age: 70
End: 2025-07-14
Payer: MEDICARE

## 2025-07-14 DIAGNOSIS — R26.9 GAIT ABNORMALITY: ICD-10-CM

## 2025-07-14 DIAGNOSIS — M54.17 L-S RADICULOPATHY: ICD-10-CM

## 2025-07-14 DIAGNOSIS — R29.898 WEAKNESS OF BOTH LOWER EXTREMITIES: ICD-10-CM

## 2025-07-14 DIAGNOSIS — M54.42 CHRONIC BILATERAL LOW BACK PAIN WITH LEFT-SIDED SCIATICA: Primary | ICD-10-CM

## 2025-07-14 DIAGNOSIS — G89.29 CHRONIC BILATERAL LOW BACK PAIN WITH LEFT-SIDED SCIATICA: Primary | ICD-10-CM

## 2025-07-14 PROCEDURE — 97112 NEUROMUSCULAR REEDUCATION: CPT

## 2025-07-14 PROCEDURE — 97530 THERAPEUTIC ACTIVITIES: CPT

## 2025-07-15 NOTE — PROGRESS NOTES
"  Outpatient Rehab    Physical Therapy Visit    Patient Name: Alisa Millan  MRN: [id number]  YOB: 1955  Encounter Date: 7/14/2025    Therapy Diagnosis:   Encounter Diagnoses   Name Primary?    Chronic bilateral low back pain with left-sided sciatica Yes    Weakness of both lower extremities     Gait abnormality     L-S radiculopathy      Physician: Sarwat Carreno MD    Physician Orders: Eval and Treat  Medical Diagnosis: Lumbar disc disorder  SI (sacroiliac) joint dysfunction  Low back pain  Surgical Diagnosis: Not applicable for this Episode   Surgical Date: Not applicable for this Episode  Days Since Last Surgery: Not applicable for this Episode    Visit # / Visits Authorized:  7 / 10  Insurance Authorization Period: 5/27/2025 to 12/31/2025  Date of Evaluation: 5/27/2025  Plan of Care Certification: 5/27/2025 to 8/19/2025      PT/PTA:     Number of PTA visits since last PT visit:   Time In: 1330   Time Out: 1445  Total Time (in minutes): 75   Total Billable Time (in minutes): 38    FOTO:  Intake Score: 47%  Survey Score 2: 47%  Survey Score 3: Not applicable for this Episode%    Precautions:         Subjective   Patient states she cont to have lower back pain, however not as intense as it once was. She states she is able to walk and perform WB activity with less difficulty. Still reports feeling dizzy from vertigo and still does not feel comfortable performing supine exercises. continues to request to put head of table at incline throughout mat exercises..         Objective            Treatment:       Alisa Millan      CPT Intervention Performed   Today Duration / Intensity     Mat Exercises        TE DKTC with ball  xx 10"10"   TE LTR xx 2x10   TE HS stretch with strap  xx 3x30" ea   NMRE TA with bridge xx 2x10, GTB iso   TE SL clams  xx GTB, 2x10   NMRE TA with leg extensions  xx 6x6   NMRE 90/90 TA with pull downs xx 2x10, Red tube with handles    NMRE 90/90 TA with heel taps  xx 6x6   TE " "FRANCISCO JAVIER press ups   10x10"               FLOOR EXERCISES        TE Recumbent bike  xx 10 min   TA Ball roll outs  x 10, 5, 5 - 10" holds    TA Pallof Press - standing with PB  x X12 ea, red PB   TA Leg press x 45#, 3x10   TA Standing hip abduction xx YTB - 2x10   TA Standing hip extension xx YTB - 2x10   TA Lateral walks // bars xx GTB at ankles - 5 laps    TA Lumbar extension stretch // bars xx 10x10"   TE OBJECTIVE TEST/MEASURES REASEESSMENTS         Manual Therapy       MT SI MET  Neural flossing, left LE       PLAN               CPT Codes available for Billing:   (0) minutes of Manual therapy (MT) to improve pain and ROM.  (NC) minutes of Therapeutic Exercise (TE) to develop strength, endurance, range of motion, and flexibility.  (10) minutes of Neuromuscular Re-Education (NMR)  to improve: Balance, Coordination, Kinesthetic, Sense, Proprioception, and Posture.  (28) minutes of Therapeutic Activities (TA) to improve functional performance.  Vasopneumatic Device Therapy () for management of swelling/edema. (59571)  Unattended Electrical Stimulation (ES) for muscle performance or pain modulation.  BFR: Blood flow restriction applied during exercise          Assessment & Plan   Assessment: Overall, patient progressing well throughout PT rehab, despite currently being most limited by vestibular/vertigo symptoms. However, patient able to perform all mat exercises with head of bed elevated to avoid reproduction of vestibular symptoms such as dizziness/light headedness.  Subjectively, patient reports mild reduction of lower back and BLE pain complaints. States radicular complaints can be on either side, however frequency of such radicular pain complaints has been decreased since onset of therapy. Objectively, PT notes mild improvement in BLE strength and functional strength/endurance and gait mechanics. PT tx today with continued emphasis on functional strengthening of BLE, core, spinal musculature with VC, TC required " throughout to facilitate proper body mechanics during squats, sit<>stands, and various functional movement patterns. Tolerates tx well. Cont per PT POC.        The patient will continue to benefit from skilled outpatient physical therapy in order to address the deficits listed in the problem list on the initial evaluation, provide patient and family education, and maximize the patients level of independence in the home and community environments.     The patient's spiritual, cultural, and educational needs were considered, and the patient is agreeable to the plan of care and goals.           Plan: Continue Plan of Care (POC) and progress per patient tolerance. See treatment section for details on planned progressions next session.    Goals:   Active       LTGs       Pt will report worst pain of 4/10 in order to progress toward max functional ability and improve quality of life          (Progressing)       Start:  05/27/25    Expected End:  08/19/25            Patient will demonstrate ability to ambulate mod I with cane x 500 feet, demonstrating normalization of gait mechanics including increased B herb through BLE , increased gait speed, min to no antalgia, and an upright posture observed throughout.  (Progressing)       Start:  05/27/25    Expected End:  08/19/25            Patient will improve AROM to stated goals, listed in objective measures above, in order to return to maximal functional potential and improve quality of life.  (Progressing)       Start:  05/27/25    Expected End:  08/19/25            Patient will improve strength to at least 4+/5 grossly,  in order to improve functional independence and quality of life.   (Progressing)       Start:  05/27/25    Expected End:  08/19/25            Patient will demonstrate improved function as indicated by a score of greater than or equal to 54 out of 100 on FOTO.          (Not Progressing)       Start:  05/27/25    Expected End:  08/19/25               STGs        Pt will report worst pain of 5/10 in order to progress toward max functional ability and improve quality of life          (Progressing)       Start:  05/27/25    Expected End:  07/08/25            Patient will improve AROM to 50% of stated goals, listed in objective measures above, in order to progress towards independence with functional activities.   (Progressing)       Start:  05/27/25    Expected End:  07/08/25            Patient will improve strength by 1/2 a grade, in order to progress towards independence with functional activities.          (Met)       Start:  05/27/25    Expected End:  07/08/25    Resolved:  06/25/25         Patient will demonstrate the ability to ambulate mod I with cane x 250 feet (household ambulation distance) with improved gait mechanics as indicated by improved WB herb through BLE,  inc step lengths, improved upright posture, and increased gait speed.  (Met)       Start:  05/27/25    Expected End:  07/08/25    Resolved:  06/25/25         Patient will demonstrate improved function as indicated by a score of greater than or equal to 52 out of 100 on FOTO.          (Progressing)       Start:  05/27/25    Expected End:  08/19/25                Ash Fajardo PT

## 2025-07-23 ENCOUNTER — CLINICAL SUPPORT (OUTPATIENT)
Dept: REHABILITATION | Facility: HOSPITAL | Age: 70
End: 2025-07-23
Payer: MEDICARE

## 2025-07-23 DIAGNOSIS — M54.42 CHRONIC BILATERAL LOW BACK PAIN WITH LEFT-SIDED SCIATICA: Primary | ICD-10-CM

## 2025-07-23 DIAGNOSIS — R26.9 GAIT ABNORMALITY: ICD-10-CM

## 2025-07-23 DIAGNOSIS — G89.29 CHRONIC BILATERAL LOW BACK PAIN WITH LEFT-SIDED SCIATICA: Primary | ICD-10-CM

## 2025-07-23 DIAGNOSIS — R29.898 WEAKNESS OF BOTH LOWER EXTREMITIES: ICD-10-CM

## 2025-07-23 DIAGNOSIS — M54.17 L-S RADICULOPATHY: ICD-10-CM

## 2025-07-23 PROCEDURE — 97530 THERAPEUTIC ACTIVITIES: CPT

## 2025-07-23 PROCEDURE — 97110 THERAPEUTIC EXERCISES: CPT

## 2025-07-23 PROCEDURE — 97112 NEUROMUSCULAR REEDUCATION: CPT

## 2025-07-24 NOTE — PROGRESS NOTES
"  Outpatient Rehab    Physical Therapy Visit    Patient Name: Alisa Millan  MRN: [id number]  YOB: 1955  Encounter Date: 7/23/2025    Therapy Diagnosis:   Encounter Diagnoses   Name Primary?    Chronic bilateral low back pain with left-sided sciatica Yes    Weakness of both lower extremities     Gait abnormality     L-S radiculopathy      Physician: Sarwat Carreno MD    Physician Orders: Eval and Treat  Medical Diagnosis: Lumbar disc disorder  SI (sacroiliac) joint dysfunction  Low back pain  Surgical Diagnosis: Not applicable for this Episode   Surgical Date: Not applicable for this Episode  Days Since Last Surgery: Not applicable for this Episode    Visit # / Visits Authorized:  8 / 10  Insurance Authorization Period: 5/27/2025 to 12/31/2025  Date of Evaluation: 5/27/2025  Plan of Care Certification: 5/27/2025 to 8/19/2025      PT/PTA:     Number of PTA visits since last PT visit:   Time In: 1300   Time Out: 1408  Total Time (in minutes): 68   Total Billable Time (in minutes): 40    FOTO:  Intake Score (%): 47  Survey Score 2 (%): 47  Survey Score 3 (%): Not applicable for this Episode    Precautions:         Subjective   Patient presents with lower back pain with mild improvements over the last one to two weeks. She reports pain extending down the right lower extremity, though it is less constant and intense. Patient feels that physical therapy, especially strength training and mobility training of the lumbar spine, has been beneficial and is motivated to continue per PT plan of care..         Objective            Treatment:        CPT Intervention Performed   Today Duration / Intensity     Mat Exercises        TE DKTC with ball  x 10"10"   TE LTR x 2x10   TE HS stretch with strap  x 3x30" ea   NMRE TA with bridge x 2x10, GTB iso   TE SL clams  x GTB, 2x10   NMRE TA with leg extensions/marches x 6x6   NMRE 90/90 TA with pull downs   2x10, Red tube with handles    NMRE 90/90 TA with heel taps  x " "6x6   TE FRANCISCO JAVIER press ups   10x10"               FLOOR EXERCISES        TE Recumbent bike  x 10 min   TA Ball roll outs  x 10, 5, 5 - 10" holds    TA Pallof Press - standing with PB  x X12 ea, red PB   TA Leg press x 45#, 3x10   TA Standing hip abduction x YTB - 2x10   TA Standing hip extension   YTB - 2x10   TA Lateral walks // bars   GTB at ankles - 5 laps    NMRE Lumbar extension stretch // bars x 10x10"   TE OBJECTIVE TEST/MEASURES REASEESSMENTS         Manual Therapy       MT SI MET  Neural flossing, left LE       PLAN               CPT Codes available for Billing:   (0) minutes of Manual therapy (MT) to improve pain and ROM.  (15, NC) minutes of Therapeutic Exercise (TE) to develop strength, endurance, range of motion, and flexibility.  (15) minutes of Neuromuscular Re-Education (NMR)  to improve: Balance, Coordination, Kinesthetic, Sense, Proprioception, and Posture.  (10) minutes of Therapeutic Activities (TA) to improve functional performance.  Vasopneumatic Device Therapy () for management of swelling/edema. (66922)  Unattended Electrical Stimulation (ES) for muscle performance or pain modulation.  BFR: Blood flow restriction applied during exercise         Assessment & Plan   Assessment: Overall, patient progressing well throughout PT rehab, despite currently being most limited by vestibular/vertigo symptoms. However, patient able to perform all mat exercises with head of bed elevated to avoid reproduction of vestibular symptoms such as dizziness/light headedness.  Subjectively, patient reports mild reduction of lower back and BLE pain complaints. States radicular complaints can be on either side, however frequency of such radicular pain complaints has been decreased since onset of therapy. Objectively, PT notes mild improvement in BLE strength and functional strength/endurance and gait mechanics. Improved quality of movement observed in transfers and bed mobility as well secondary to decreased pain and " guarding. PT tx today with continued emphasis on functional strengthening of BLE, core, spinal musculature with VC, TC required throughout to facilitate proper body mechanics during squats, sit<>stands, and various functional movement patterns. Tolerates tx well. Cont per PT POC.        The patient will continue to benefit from skilled outpatient physical therapy in order to address the deficits listed in the problem list on the initial evaluation, provide patient and family education, and maximize the patients level of independence in the home and community environments.     The patient's spiritual, cultural, and educational needs were considered, and the patient is agreeable to the plan of care and goals.           Plan: Continue Plan of Care (POC) and progress per patient tolerance. See treatment section for details on planned progressions next session.    Goals:   Active       LTGs       Pt will report worst pain of 4/10 in order to progress toward max functional ability and improve quality of life          (Progressing)       Start:  05/27/25    Expected End:  08/19/25            Patient will demonstrate ability to ambulate mod I with cane x 500 feet, demonstrating normalization of gait mechanics including increased B herb through BLE , increased gait speed, min to no antalgia, and an upright posture observed throughout.  (Progressing)       Start:  05/27/25    Expected End:  08/19/25            Patient will improve AROM to stated goals, listed in objective measures above, in order to return to maximal functional potential and improve quality of life.  (Progressing)       Start:  05/27/25    Expected End:  08/19/25            Patient will improve strength to at least 4+/5 grossly,  in order to improve functional independence and quality of life.   (Progressing)       Start:  05/27/25    Expected End:  08/19/25            Patient will demonstrate improved function as indicated by a score of greater than or equal  to 54 out of 100 on FOTO.          (Not Progressing)       Start:  05/27/25    Expected End:  08/19/25               STGs       Pt will report worst pain of 5/10 in order to progress toward max functional ability and improve quality of life          (Progressing)       Start:  05/27/25    Expected End:  07/08/25            Patient will improve AROM to 50% of stated goals, listed in objective measures above, in order to progress towards independence with functional activities.   (Progressing)       Start:  05/27/25    Expected End:  07/08/25            Patient will improve strength by 1/2 a grade, in order to progress towards independence with functional activities.          (Met)       Start:  05/27/25    Expected End:  07/08/25    Resolved:  06/25/25         Patient will demonstrate the ability to ambulate mod I with cane x 250 feet (household ambulation distance) with improved gait mechanics as indicated by improved WB herb through BLE,  inc step lengths, improved upright posture, and increased gait speed.  (Met)       Start:  05/27/25    Expected End:  07/08/25    Resolved:  06/25/25         Patient will demonstrate improved function as indicated by a score of greater than or equal to 52 out of 100 on FOTO.          (Progressing)       Start:  05/27/25    Expected End:  08/19/25                Ash Fajardo, PT

## 2025-07-29 ENCOUNTER — CLINICAL SUPPORT (OUTPATIENT)
Dept: REHABILITATION | Facility: HOSPITAL | Age: 70
End: 2025-07-29
Payer: MEDICARE

## 2025-07-29 DIAGNOSIS — R26.9 GAIT ABNORMALITY: ICD-10-CM

## 2025-07-29 DIAGNOSIS — M54.17 L-S RADICULOPATHY: ICD-10-CM

## 2025-07-29 DIAGNOSIS — M54.42 CHRONIC BILATERAL LOW BACK PAIN WITH LEFT-SIDED SCIATICA: Primary | ICD-10-CM

## 2025-07-29 DIAGNOSIS — R29.898 WEAKNESS OF BOTH LOWER EXTREMITIES: ICD-10-CM

## 2025-07-29 DIAGNOSIS — G89.29 CHRONIC BILATERAL LOW BACK PAIN WITH LEFT-SIDED SCIATICA: Primary | ICD-10-CM

## 2025-07-29 PROCEDURE — 97112 NEUROMUSCULAR REEDUCATION: CPT

## 2025-07-29 PROCEDURE — 97530 THERAPEUTIC ACTIVITIES: CPT

## 2025-07-30 NOTE — PROGRESS NOTES
Outpatient Rehab    Physical Therapy Progress Note    Patient Name: Alisa Millan  MRN: [id number]  YOB: 1955  Encounter Date: 7/29/2025    Therapy Diagnosis:   Encounter Diagnoses   Name Primary?    Chronic bilateral low back pain with left-sided sciatica Yes    Weakness of both lower extremities     Gait abnormality     L-S radiculopathy      Physician: Sarwat Carreno MD    Physician Orders: Eval and Treat  Medical Diagnosis: Lumbar disc disorder  SI (sacroiliac) joint dysfunction  Low back pain  Surgical Diagnosis: Not applicable for this Episode   Surgical Date: Not applicable for this Episode  Days Since Last Surgery: Not applicable for this Episode    Visit # / Visits Authorized:  9 / 10  Insurance Authorization Period: 5/27/2025 to 12/31/2025  Date of Evaluation: 5/27/2025  Plan of Care Certification: 5/27/2025 to 8/19/2025      PT/PTA:     Number of PTA visits since last PT visit:   Time In: 0910   Time Out: 1020  Total Time (in minutes): 70   Total Billable Time (in minutes): 45    FOTO: 51  Intake Score (%): 47  Survey Score 2 (%): 47  Survey Score 3 (%): Not applicable for this Episode    Precautions:       Subjective   Overall, patient pleased with progression throughout PT rehab. She states she has noticed improvement in lower back mobility and standing/walking tolerance. States she continues to notice gradual improvement in LE strength as well. However, she states she cont to present with lower back pain with occassional pain extending down right > left LE - although such radicular complaints are reported to be less frequent or intense. She states she would like to cont with therapy needed to further provide pain relief while maximizing functional strength and mobility to allow for greater participation in WB activity with less restrictions..  Pain reported as 5/10.      Objective          Gait Analysis: mod I with SPC, however noted improvement in WB tolerance through BLE with  increased gait speed and improved upright posture observed         Functional Tests  Outcome Norms   Timed Up and Go 18 seconds  <13.5 sec   30 second Sit to Stand N/a Age Male Female   60-64 <14 <12   65-69 <12 <11   70-74 <12 <10   75-79 <11 <10   80-84 <10 <9   85-89 <8 <8   90-93 <7 <4      Five Time Sit to Stand     Greater than 14 sec high risk  12.1-14 sec increased risk  12 sec or less low risk 18  20-29 yrs ? 6.0±1.4 sec.  30-39 yrs ? 6.1±1.4 sec.  40-49 yrs ? 7.6±1.8 sec.  50-59 yrs ? 7.7±2.6 sec.  60-69 yrs ? 8.4±0.0 sec (male), 12.7±1.8 sec (female)  70-79 yrs ? 11.6±3.4 sec (male), 13.0±4.8 sec (female)  80-89 yrs ? 16.7±4.5 sec (male), 17.2±5.5 sec (female)   6 minutes walk N/a 60s: >538f, 572m  70s: >471f, 527m  80s: >417f, 392m         RANGE OF MOTION:   Lumbar  (% of full range) Right  (spine) Left    Pain/Dysfunction with Movement Goal    Lumbar Flexion  75% ---   80%   Lumbar Extension MABEL ---   10%   Lumbar Side Bending 70% 80%   75%   Lumbar Rotation 75% 75%   75%             STRENGTH: (* denotes pain)  L/E MMT Right  (spine) Left Dysfunction with Movement Goal   Modified (90/90) Abdominal Strength    ---       Hip Flexion  4/5 4/5   4+/5 B   Hip Extension  4-/5 4-/5   4+/5 B   Hip Abduction  4/5 4-/5   4+/5 B   Knee Extension 4+/5 4+/5   5/5 B   Knee Flexion 4+/5 4+/5   5/5 B   Hip IR 4/5 4/5   4+/5 B   Hip ER 4/5 4/5   4+/5 B   Ankle DF 4/5 4/5   5/5 B   Ankle PF 4+/5 4+/5   5/5 B   Ankle Inversion       5/5 B   Ankle Eversion       5/5 B         MUSCLE LENGTH:   Muscle Tested  Right Left  Limitation Goal   Hip Flexors [] Normal  [x] Limited [] Normal  [x] Limited   Normal B   ITB / TFL [] Normal  [] Limited [] Normal  [] Limited   Normal B   Quadriceps [] Normal  [] Limited [] Normal  [] Limited   Normal B   Hamstrings  [] Normal  [] Limited [] Normal  [] Limited   Normal B   Piriformis  [] Normal  [x] Limited [] Normal  [x] Limited   Normal B   Gastrocnemius  [] Normal  [] Limited []  "Normal  [] Limited   Normal B   Soleus  [] Normal  [] Limited [] Normal  [] Limited   Normal B            JOINT MOBILITY:   Joint Motion  Right Mobility  (spine) Left Mobility Goal   Thoracic PA  [] Hypo     [] Normal     [] Hyper --- Normal   Costotransverse  [] Hypo     [] Normal     [] Hyper [] Hypo     [] Normal     [] Hyper Normal    Lumbar PA [x] Hypo     [] Normal     [] Hyper --- Normal   Lumbar Unilat. PA [x] Hypo     [] Normal     [] Hyper [] Hypo     [] Normal     [] Hyper Normal   Hip:  [] Hypo     [] Normal     [] Hyper [] Hypo     [] Normal     [] Hyper Normal   SIJ:  [] Hypo     [] Normal     [] Hyper [] Hypo     [] Normal     [] Hyper Normal            SPECIAL TESTS:     Right  (spine) Left  Goal   Lumbar Distraction  [] Positive     [] Negative --- Negative B    Lumbar Compression [] Positive     [] Negative --- Negative B    SLUMP [] Positive     [] Negative [x] Positive     [] Negative Negative B    Straight Leg Raise [] Positive     [] Negative [x] Positive     [] Negative Negative B    ASIS Compression [x] Positive     [] Negative --- Negative    ASIS Distraction  [] Positive     [] Negative --- Negative    Posterior Shear [] Positive     [] Negative [] Positive     [] Negative Negative B    KEMAR [x] Positive     [] Negative [] Positive     [] Negative Negative B         Treatment:          CPT Intervention Performed   Today Duration / Intensity     Mat Exercises        TE DKTC with ball  xx 10"10"   TE LTR xx 2x10   TE HS stretch with strap  xx 3x30" ea   NMRE TA with bridge xx 2x10, GTB iso   TE SL clams  xx GTB, 2x10   NMRE TA with leg extensions/marches xx 6x6   NMRE 90/90 TA with pull downs   2x10, Red tube with handles    NMRE 90/90 TA with heel taps  xx 6x6   TE FRANCISCO JAVIER press ups   10x10"               FLOOR EXERCISES        TE Recumbent bike  xx 10 min   TA Ball roll outs  xx 10, 5, 5 - 10" holds    TA Pallof Press - standing with PB  xx X12 ea, red PB   TA Leg press xx 55#, 3x10   TA " "Standing hip abduction xx RedTB - 2x10   TA Standing hip extension xx RedTB - 2x10   TA Lateral walks // bars xx GTB at ankles - 5 laps    TA Lumbar extension stretch // bars   10x10"   TE OBJECTIVE TEST/MEASURES REASEESSMENTS         Manual Therapy       MT SI MET  Neural flossing, left LE       PLAN               CPT Codes available for Billing:   (0) minutes of Manual therapy (MT) to improve pain and ROM.  (NC) minutes of Therapeutic Exercise (TE) to develop strength, endurance, range of motion, and flexibility.  (15) minutes of Neuromuscular Re-Education (NMR)  to improve: Balance, Coordination, Kinesthetic, Sense, Proprioception, and Posture.  (25) minutes of Therapeutic Activities (TA) to improve functional performance.  Vasopneumatic Device Therapy () for management of swelling/edema. (64095)  Unattended Electrical Stimulation (ES) for muscle performance or pain modulation.  BFR: Blood flow restriction applied during exercise         Assessment & Plan   Assessment: Overall, patient progressing well throughout PT rehab, despite currently being most limited by vestibular/vertigo symptoms. However, patient able to perform all mat exercises with head of bed elevated to avoid reproduction of vestibular symptoms such as dizziness/light headedness.  Subjectively, patient reports mild reduction of lower back and BLE pain complaints. States radicular complaints can be on either side, however frequency of such radicular pain complaints has been decreased since onset of therapy. Objectively, PT notes mild improvement in BLE strength and functional strength/endurance and gait mechanics. Improved quality of movement observed in transfers and bed mobility as well secondary to decreased pain and guarding. PT tx today with continued emphasis on functional strengthening of BLE, core, spinal musculature with VC, TC required throughout to facilitate proper body mechanics during squats, sit<>stands, and various functional " movement patterns. Tolerates tx well. Cont per PT POC.        The patient will continue to benefit from skilled outpatient physical therapy in order to address the deficits listed in the problem list on the initial evaluation, provide patient and family education, and maximize the patients level of independence in the home and community environments.     The patient's spiritual, cultural, and educational needs were considered, and the patient is agreeable to the plan of care and goals.           Plan: Continue Plan of Care (POC) and progress per patient tolerance. See treatment section for details on planned progressions next session.    Goals:   Active       LTGs       Pt will report worst pain of 4/10 in order to progress toward max functional ability and improve quality of life          (Progressing)       Start:  05/27/25    Expected End:  08/19/25            Patient will demonstrate ability to ambulate mod I with cane x 500 feet, demonstrating normalization of gait mechanics including increased B herb through BLE , increased gait speed, min to no antalgia, and an upright posture observed throughout.  (Met)       Start:  05/27/25    Expected End:  08/19/25    Resolved:  07/29/25         Patient will improve AROM to stated goals, listed in objective measures above, in order to return to maximal functional potential and improve quality of life.  (Progressing)       Start:  05/27/25    Expected End:  08/19/25            Patient will improve strength to at least 4+/5 grossly,  in order to improve functional independence and quality of life.   (Progressing)       Start:  05/27/25    Expected End:  08/19/25            Patient will demonstrate improved function as indicated by a score of greater than or equal to 54 out of 100 on FOTO.          (Progressing)       Start:  05/27/25    Expected End:  08/19/25               STGs       Pt will report worst pain of 5/10 in order to progress toward max functional ability and  improve quality of life          (Progressing)       Start:  05/27/25    Expected End:  07/08/25            Patient will improve AROM to 50% of stated goals, listed in objective measures above, in order to progress towards independence with functional activities.   (Met)       Start:  05/27/25    Expected End:  07/08/25    Resolved:  07/29/25         Patient will improve strength by 1/2 a grade, in order to progress towards independence with functional activities.          (Met)       Start:  05/27/25    Expected End:  07/08/25    Resolved:  06/25/25         Patient will demonstrate the ability to ambulate mod I with cane x 250 feet (household ambulation distance) with improved gait mechanics as indicated by improved WB herb through BLE,  inc step lengths, improved upright posture, and increased gait speed.  (Met)       Start:  05/27/25    Expected End:  07/08/25    Resolved:  06/25/25         Patient will demonstrate improved function as indicated by a score of greater than or equal to 52 out of 100 on FOTO.          (Progressing)       Start:  05/27/25    Expected End:  08/19/25              Disclaimer: This note was generated with the assistance of ambient listening technology and dictation technology. Verbal consent was obtained by the patient and any accompanying visitor(s) for the recording of patient appointment to facilitate this note. I attest to having reviewed and edited the generated note for accuracy, though some syntax or spelling errors may persist. Please contact the author of this note for any clarification.      Ash Fajardo, PT

## 2025-08-05 ENCOUNTER — CLINICAL SUPPORT (OUTPATIENT)
Dept: REHABILITATION | Facility: HOSPITAL | Age: 70
End: 2025-08-05
Payer: MEDICARE

## 2025-08-05 DIAGNOSIS — M54.42 CHRONIC BILATERAL LOW BACK PAIN WITH LEFT-SIDED SCIATICA: Primary | ICD-10-CM

## 2025-08-05 DIAGNOSIS — R29.898 WEAKNESS OF BOTH LOWER EXTREMITIES: ICD-10-CM

## 2025-08-05 DIAGNOSIS — R26.9 GAIT ABNORMALITY: ICD-10-CM

## 2025-08-05 DIAGNOSIS — M54.17 L-S RADICULOPATHY: ICD-10-CM

## 2025-08-05 DIAGNOSIS — G89.29 CHRONIC BILATERAL LOW BACK PAIN WITH LEFT-SIDED SCIATICA: Primary | ICD-10-CM

## 2025-08-05 PROCEDURE — 97530 THERAPEUTIC ACTIVITIES: CPT

## 2025-08-05 PROCEDURE — 97112 NEUROMUSCULAR REEDUCATION: CPT

## 2025-08-05 PROCEDURE — 97140 MANUAL THERAPY 1/> REGIONS: CPT

## 2025-08-06 NOTE — PROGRESS NOTES
Outpatient Rehab    Physical Therapy Visit    Patient Name: Alisa Millan  MRN: [id number]  YOB: 1955  Encounter Date: 8/5/2025    Therapy Diagnosis:   Encounter Diagnoses   Name Primary?    Chronic bilateral low back pain with left-sided sciatica Yes    Weakness of both lower extremities     Gait abnormality     L-S radiculopathy      Physician: Sarwat Carreno MD    Physician Orders: Eval and Treat  Medical Diagnosis: Lumbar disc disorder  SI (sacroiliac) joint dysfunction  Low back pain  Surgical Diagnosis: Not applicable for this Episode   Surgical Date: Not applicable for this Episode  Days Since Last Surgery: Not applicable for this Episode    Visit # / Visits Authorized:  10 / 10  Insurance Authorization Period: 5/27/2025 to 12/31/2025  Date of Evaluation: 5/27/2025  Plan of Care Certification: 5/27/2025 to 8/19/2025      PT/PTA:     Number of PTA visits since last PT visit:   Time In: 1330   Time Out: 1438  Total Time (in minutes): 68   Total Billable Time (in minutes): 62    FOTO:  Intake Score (%): 47  Survey Score 2 (%): 47  Survey Score 3 (%): Not applicable for this Episode    Precautions:         Subjective   Patient presents to the clinic today with a mild increase in lower back pain. She reports increased pain and soreness following the last treatment session, unsure if one particular exercise caused the pain, but suspects bridge exercises may have exacerbated her pain. She rates her current pain as 4/10, localized to the lower lumbar region. Despite mild increased pain after the last session, she notices a decline in intensity and frequency of radicular pain, numbness, and tingling down bilateral lower extremities. She feels physical therapy is helping, noting increased strength, longer standing durations, and improved walking distances with fewer restrictions. Patient wishes to continue therapy to improve functional strength and weight-bearing tolerance for optimal performance in  "routine household and community ADLs..         Objective            Treatment:     Alisa Millan    CPT Intervention Performed   Today Duration / Intensity     Mat Exercises        TE DKTC with ball  x 10"10"   TE LTR x 2x10   TE HS stretch with strap  x 3x30" ea   NMRE TA with bridge Not today 2x10, GTB iso   TE SL clams  x GTB, 2x10   NMRE TA with leg extensions/marches x  Marches  6x6   NMRE 90/90 TA with pull downs   2x10, Red tube with handles    NMRE 90/90 TA with heel taps  x 6x6   TE FRANCISCO JAVIER press ups x 10x10"               FLOOR EXERCISES        TE Recumbent bike  x 10 min   TA Ball roll outs  x 10, 5, 5 - 10" holds, (to improve spinal ROM for bending)   NMRE Pallof Press - standing with PB  x X12 ea, red PB   TA Leg press x 55#, 3x10   TA Standing hip abduction x RedTB - 2x10   TA Standing hip extension x RedTB - 2x10   NMRE Lateral walks // bars x GTB at ankles - 5 laps    TE Lumbar extension stretch // bars x 10x10"   TE OBJECTIVE TEST/MEASURES REASEESSMENTS         Manual Therapy       MT SI MET  Neural flossing, left LE       PLAN               CPT Codes available for Billing:   (10) minutes of Manual therapy (MT) to improve pain and ROM.  (NC) minutes of Therapeutic Exercise (TE) to develop strength, endurance, range of motion, and flexibility.  (15) minutes of Neuromuscular Re-Education (NMR)  to improve: Balance, Coordination, Kinesthetic, Sense, Proprioception, and Posture.  (30) minutes of Therapeutic Activities (TA) to improve functional performance.  Vasopneumatic Device Therapy () for management of swelling/edema. (15405)  Unattended Electrical Stimulation (ES) for muscle performance or pain modulation.  BFR: Blood flow restriction applied during exercise          Assessment & Plan   Assessment: Patient continues to present with lumbar radiculopathy. Radicular symptoms in the right lower extremity are less intense and frequent compared to the initial evaluation. Manual therapy, including soft " tissue mobilization and lumbar distraction, is beneficial in reducing muscle tension and improving segmental mobility of the lower lumbar spine. Functional strengthening of bilateral lower extremity, core, and spinal musculature continues to promote dynamic stability of the lumbar spine, aiding in pain relief and improving weight-bearing tolerance. Patient tolerates all treatment well with minimal pain complaints throughout. VC/TC required per PT to assure proper form/technique and posture, while minimizing compensatory strategies throughout exercise performance. Justification for continued therapy includes ongoing improvements in strength and functional mobility.        The patient will continue to benefit from skilled outpatient physical therapy in order to address the deficits listed in the problem list on the initial evaluation, provide patient and family education, and maximize the patients level of independence in the home and community environments.     The patient's spiritual, cultural, and educational needs were considered, and the patient is agreeable to the plan of care and goals.           Plan: Continue Plan of Care (POC) and progress per patient tolerance. See treatment section for details on planned progressions next session.    Goals:   Active       LTGs       Pt will report worst pain of 4/10 in order to progress toward max functional ability and improve quality of life          (Progressing)       Start:  05/27/25    Expected End:  08/19/25            Patient will demonstrate ability to ambulate mod I with cane x 500 feet, demonstrating normalization of gait mechanics including increased B herb through BLE , increased gait speed, min to no antalgia, and an upright posture observed throughout.  (Met)       Start:  05/27/25    Expected End:  08/19/25    Resolved:  07/29/25         Patient will improve AROM to stated goals, listed in objective measures above, in order to return to maximal functional  potential and improve quality of life.  (Progressing)       Start:  05/27/25    Expected End:  08/19/25            Patient will improve strength to at least 4+/5 grossly,  in order to improve functional independence and quality of life.   (Progressing)       Start:  05/27/25    Expected End:  08/19/25            Patient will demonstrate improved function as indicated by a score of greater than or equal to 54 out of 100 on FOTO.          (Progressing)       Start:  05/27/25    Expected End:  08/19/25               STGs       Pt will report worst pain of 5/10 in order to progress toward max functional ability and improve quality of life          (Progressing)       Start:  05/27/25    Expected End:  07/08/25            Patient will improve AROM to 50% of stated goals, listed in objective measures above, in order to progress towards independence with functional activities.   (Met)       Start:  05/27/25    Expected End:  07/08/25    Resolved:  07/29/25         Patient will improve strength by 1/2 a grade, in order to progress towards independence with functional activities.          (Met)       Start:  05/27/25    Expected End:  07/08/25    Resolved:  06/25/25         Patient will demonstrate the ability to ambulate mod I with cane x 250 feet (household ambulation distance) with improved gait mechanics as indicated by improved WB herb through BLE,  inc step lengths, improved upright posture, and increased gait speed.  (Met)       Start:  05/27/25    Expected End:  07/08/25    Resolved:  06/25/25         Patient will demonstrate improved function as indicated by a score of greater than or equal to 52 out of 100 on FOTO.          (Progressing)       Start:  05/27/25    Expected End:  08/19/25                Ash Fajardo, PT

## 2025-08-26 ENCOUNTER — CLINICAL SUPPORT (OUTPATIENT)
Dept: REHABILITATION | Facility: HOSPITAL | Age: 70
End: 2025-08-26
Payer: MEDICARE

## 2025-08-26 DIAGNOSIS — M54.42 CHRONIC BILATERAL LOW BACK PAIN WITH LEFT-SIDED SCIATICA: Primary | ICD-10-CM

## 2025-08-26 DIAGNOSIS — R29.898 WEAKNESS OF BOTH LOWER EXTREMITIES: ICD-10-CM

## 2025-08-26 DIAGNOSIS — M54.17 L-S RADICULOPATHY: ICD-10-CM

## 2025-08-26 DIAGNOSIS — R26.9 GAIT ABNORMALITY: ICD-10-CM

## 2025-08-26 DIAGNOSIS — G89.29 CHRONIC BILATERAL LOW BACK PAIN WITH LEFT-SIDED SCIATICA: Primary | ICD-10-CM

## 2025-08-26 PROCEDURE — 97530 THERAPEUTIC ACTIVITIES: CPT | Performed by: PHYSICAL THERAPIST

## 2025-08-26 PROCEDURE — 97110 THERAPEUTIC EXERCISES: CPT | Performed by: PHYSICAL THERAPIST

## 2025-08-26 PROCEDURE — 97112 NEUROMUSCULAR REEDUCATION: CPT | Performed by: PHYSICAL THERAPIST

## 2025-09-02 ENCOUNTER — CLINICAL SUPPORT (OUTPATIENT)
Dept: REHABILITATION | Facility: HOSPITAL | Age: 70
End: 2025-09-02
Payer: MEDICARE

## 2025-09-02 DIAGNOSIS — G89.29 CHRONIC BILATERAL LOW BACK PAIN WITH LEFT-SIDED SCIATICA: Primary | ICD-10-CM

## 2025-09-02 DIAGNOSIS — M54.17 L-S RADICULOPATHY: ICD-10-CM

## 2025-09-02 DIAGNOSIS — M54.42 CHRONIC BILATERAL LOW BACK PAIN WITH LEFT-SIDED SCIATICA: Primary | ICD-10-CM

## 2025-09-02 DIAGNOSIS — R26.9 GAIT ABNORMALITY: ICD-10-CM

## 2025-09-02 DIAGNOSIS — R29.898 WEAKNESS OF BOTH LOWER EXTREMITIES: ICD-10-CM

## 2025-09-02 PROCEDURE — 97110 THERAPEUTIC EXERCISES: CPT | Performed by: PHYSICAL THERAPIST

## 2025-09-02 PROCEDURE — 97112 NEUROMUSCULAR REEDUCATION: CPT | Performed by: PHYSICAL THERAPIST

## 2025-09-02 PROCEDURE — 97530 THERAPEUTIC ACTIVITIES: CPT | Performed by: PHYSICAL THERAPIST

## 2025-09-04 ENCOUNTER — CLINICAL SUPPORT (OUTPATIENT)
Dept: REHABILITATION | Facility: HOSPITAL | Age: 70
End: 2025-09-04
Payer: MEDICARE

## 2025-09-04 DIAGNOSIS — G89.29 CHRONIC BILATERAL LOW BACK PAIN WITH LEFT-SIDED SCIATICA: Primary | ICD-10-CM

## 2025-09-04 DIAGNOSIS — M54.17 L-S RADICULOPATHY: ICD-10-CM

## 2025-09-04 DIAGNOSIS — M54.42 CHRONIC BILATERAL LOW BACK PAIN WITH LEFT-SIDED SCIATICA: Primary | ICD-10-CM

## 2025-09-04 DIAGNOSIS — R29.898 WEAKNESS OF BOTH LOWER EXTREMITIES: ICD-10-CM

## 2025-09-04 DIAGNOSIS — R26.9 GAIT ABNORMALITY: ICD-10-CM

## 2025-09-04 PROCEDURE — 97530 THERAPEUTIC ACTIVITIES: CPT | Performed by: PHYSICAL THERAPIST

## 2025-09-04 PROCEDURE — 97110 THERAPEUTIC EXERCISES: CPT | Performed by: PHYSICAL THERAPIST
